# Patient Record
Sex: FEMALE | Race: WHITE | NOT HISPANIC OR LATINO | Employment: UNEMPLOYED | URBAN - METROPOLITAN AREA
[De-identification: names, ages, dates, MRNs, and addresses within clinical notes are randomized per-mention and may not be internally consistent; named-entity substitution may affect disease eponyms.]

---

## 2017-06-09 PROBLEM — G25.0 ESSENTIAL TREMOR: Status: ACTIVE | Noted: 2017-06-09

## 2017-06-09 PROBLEM — H91.93 HEARING PROBLEM OF BOTH EARS: Status: ACTIVE | Noted: 2017-06-09

## 2017-06-09 PROBLEM — E89.0 POSTOPERATIVE HYPOTHYROIDISM: Status: ACTIVE | Noted: 2017-06-09

## 2017-06-09 PROBLEM — F32.4 MAJOR DEPRESSIVE DISORDER WITH SINGLE EPISODE, IN PARTIAL REMISSION: Status: ACTIVE | Noted: 2017-06-09

## 2017-06-09 PROBLEM — F41.1 GAD (GENERALIZED ANXIETY DISORDER): Status: ACTIVE | Noted: 2017-06-09

## 2020-04-17 PROBLEM — E66.01 MORBID OBESITY: Status: ACTIVE | Noted: 2020-04-17

## 2022-05-18 ENCOUNTER — PATIENT MESSAGE (OUTPATIENT)
Dept: ADMINISTRATIVE | Facility: OTHER | Age: 27
End: 2022-05-18

## 2023-02-25 PROBLEM — Z15.89 PTEN GENE MUTATION: Status: ACTIVE | Noted: 2023-02-25

## 2023-03-17 ENCOUNTER — PATIENT MESSAGE (OUTPATIENT)
Dept: RESEARCH | Facility: HOSPITAL | Age: 28
End: 2023-03-17
Payer: MEDICAID

## 2023-04-01 DIAGNOSIS — Z15.89 PTEN GENE MUTATION: Primary | ICD-10-CM

## 2023-04-01 NOTE — ASSESSMENT & PLAN NOTE
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  This mutation gives her up to an 89% risk for developing breast cancer.  She also knows that it can cause thyroid (35%) and kidney (35%) cancers.  Uterine cancer risk is as high as 28% and colon cancers occur in 9%.  It can also cause skin, vascular and brain abnormalities.   We discussed aggressive imaging for surveillance (mammogram alternating with Breast MRI every 6 months) vs. prophylactic mastectomy.  No guarantees can be made that with increased surveillance - that a cancer would be found "early".  We talked about the social, psychological, and emotional issues surrounding bilateral risk reduction surgery.    She knows that Prophylactic Mastectomy can decrease her risks by 90-95%, but that breast cancer is still possible.  Because of this, ongoing and routine follow-ups are recommended.  We spent time discussing the pros and cons of prophylactic mastectomy along with the R/B/I and alternatives.  She was given the opportunity to visit with a Plastic Surgeon to address reconstruction issues.  She seems to be digesting this information in an appropriate manner.    At this time, she is desirous of risk reduction mastectomies but wants to consider more children/breast feeding first.  I think this is reasonable and we will follow her aggressively until she makes her final decision to proceed.    As her grandmother was 35 at the time of her breast cancer diagnosis, Renée can start alternating MGM and MRI now.  We will help her obtain her first in 6 months at the cessation of breast feeding.    "

## 2023-04-01 NOTE — PROGRESS NOTES
[facility]  Girish Bauer MD, FACS  Jayesh Coronado, EARNEST-C      Chief Complaint:   Renée Riojas is a 28 y.o. female presenting today to establish care given her Positive PTEN Mutation results.    She is currently breast feeding    History of Present Illness:   Mrs. Riojas presents on April 10, 2023 due to testing positive for a deleterious PTEN Gene.  She has a worrisome family history of breast cancer.  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  This mutation gives her up to an 85% risk for developing breast cancer.  She also knows that it can cause thyroid (35%) and kidney (35%) cancers.  Uterine cancer risk is as high as 28% and colon cancers occur in 9%.  It can also cause skin, vascular and brain abnormalities.   We discussed aggressive imagi    Sleep apnea     Thyroid disease     Tremor of right hand       Past Surgical History:   Procedure Laterality Date    THYROIDECTOMY, PARTIAL      TONSILLECTOMY          Current Outpatient Medications:     ferrous sulfate (FEROSUL) 325 mg (65 mg iron) Tab tablet, TAKE 1 TABLET BY MOUTH DAILY, Disp: 30 tablet, Rfl: 3    FLUoxetine 20 MG capsule, Take 1 capsule (20 mg total) by mouth once daily., Disp: 30 capsule, Rfl: 11    levothyroxine (SYNTHROID) 100 MCG tablet, Take 1 tablet (100 mcg total) by mouth before breakfast., Disp: 30 tablet, Rfl: 11    metFORMIN (GLUCOPHAGE-XR) 500 MG ER 24hr tablet, , Disp: , Rfl:     norethindrone (MICRONOR) 0.35 mg tablet, Take 1 tablet (0.35 mg total) by mouth once daily., Disp: 30 tablet, Rfl:  11    valACYclovir (VALTREX) 500 MG tablet, Take 1 tablet (500 mg total) by mouth once daily. Take 2000mg (4tabs) [id number] for 2 doses upon outbreak, Disp: 30 tablet, Rfl: 2   Review of patient's allergies indicates:   Allergen Reactions    Latex      Other reaction(s): Rash    Nutmeg oil (myristica seed oil)      Other reaction(s): Hives    Pumpkin      Other reaction(s): Hives      Social History     Tobacco Use    Smoking status: Never    Smokeless tobacco: Never   Substance Use Topics    Alcohol use: Yes     Comment: Occasionally      Family History   Problem Relation Age of Onset    Hypertension Mother     Diabetes type II Mother     Hypertension Father     Hypertension Maternal Grandmother     Breast cancer Maternal Grandmother 35    Diabetes type II Maternal Grandmother         Review of Systems   Integumentary:  Negative for color change, rash, mole/lesion, breast mass, breast discharge and breast tenderness.   Breast: Negative for mass and tenderness     Physical Exam   Constitutional: She appears well-developed. She is cooperative.   HENT:   Head: Normocephalic.   Cardiovascular:  Normal rate and regular rhythm.            Pulmonary/Chest: She exhibits no tenderness and no bony tenderness. Right breast exhibits no mass, no nipple discharge, no skin change and no tenderness. Left breast exhibits no mass, no nipple discharge, no skin change and no tenderness.   Abdominal: Soft. Normal appearance.   Musculoskeletal: Lymphadenopathy:      Upper Body:      Right upper body: No supraclavicular or axillary adenopathy.      Left upper body: No supraclavicular or axillary adenopathy.     Neurological: She is alert.   Skin: No rash noted.        MAMMOGRAM REPORT: we will obtain in September at the conclusion of breast feeding    ASSESSMENT and PLAN of CARE    1. 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  This mutation  "gives her up to an 89% risk for developing breast cancer.  She also knows that it can cause thyroid (35%) and kidney (35%) cancers.  Uterine cancer risk is as high as 28% and colon cancers occur in 9%.  It can also cause skin, vascular and brain abnormalities.   We discussed aggressive imaging for surveillance (mammogram alternating with Breast MRI every 6 months) vs. prophylactic mastectomy.  No guarantees can be made that with increased surveillance - that a cancer would be found "early".  We talked about the social, psychological, and emotional issues surrounding bilateral risk reduction surgery.    She knows that Prophylactic Mastectomy can decrease her risks by 90-95%, but that breast cancer is still possible.  Because of this, ongoing and routine follow-ups are recommended.  We spent time discussing the pros and cons of prophylactic mastectomy along with the R/B/I and alternatives.  She was given the opportunity to visit with a Plastic Surgeon to address reconstruction issues.  She seems to be digesting this information in an appropriate manner.    At this time, she is desirous of risk reduction mastectomies but wants to consider more children/breast feeding first.  I think this is reasonable and we will follow her aggressively until she makes her final decision to proceed.    As her grandmother was 35 at the time of her breast cancer diagnosis, Renée can start alternating MGM and MRI now.  We will help her obtain her first in 6 months at the cessation of breast feeding.        Medical Decision Making:  It is my impression that this patient suffers all conditions contained in this medical document.  Each of these conditions did affect our plan of care and my medical decision making today.  It is my opinion that the medical decision making concerning this patient was of moderate difficulty based on the aforementioned conditions.  Any further recommendations will be communicated to the patient by me.  I have " reviewed and verified her allergies, list of medications, medical and surgical histories, social history, and a pertinent review of symptoms.     Follow up:  6 months and prn    For:  PE and MGM (S)

## 2023-04-04 ENCOUNTER — PATIENT MESSAGE (OUTPATIENT)
Dept: SURGERY | Facility: CLINIC | Age: 28
End: 2023-04-04
Payer: MEDICAID

## 2023-04-10 ENCOUNTER — OFFICE VISIT (OUTPATIENT)
Dept: SURGERY | Facility: CLINIC | Age: 28
End: 2023-04-10
Payer: MEDICAID

## 2023-04-10 VITALS — BODY MASS INDEX: 41.23 KG/M2 | WEIGHT: 272.06 LBS | HEIGHT: 68 IN

## 2023-04-10 DIAGNOSIS — Z15.89 PTEN GENE MUTATION: Primary | ICD-10-CM

## 2023-04-10 PROCEDURE — 1160F RVW MEDS BY RX/DR IN RCRD: CPT | Mod: CPTII,S$GLB,, | Performed by: SPECIALIST

## 2023-04-10 PROCEDURE — 1160F PR REVIEW ALL MEDS BY PRESCRIBER/CLIN PHARMACIST DOCUMENTED: ICD-10-PCS | Mod: CPTII,S$GLB,, | Performed by: SPECIALIST

## 2023-04-10 PROCEDURE — 99203 OFFICE O/P NEW LOW 30 MIN: CPT | Mod: S$GLB,,, | Performed by: SPECIALIST

## 2023-04-10 PROCEDURE — 3008F PR BODY MASS INDEX (BMI) DOCUMENTED: ICD-10-PCS | Mod: CPTII,S$GLB,, | Performed by: SPECIALIST

## 2023-04-10 PROCEDURE — 1159F PR MEDICATION LIST DOCUMENTED IN MEDICAL RECORD: ICD-10-PCS | Mod: CPTII,S$GLB,, | Performed by: SPECIALIST

## 2023-04-10 PROCEDURE — 3008F BODY MASS INDEX DOCD: CPT | Mod: CPTII,S$GLB,, | Performed by: SPECIALIST

## 2023-04-10 PROCEDURE — 99203 PR OFFICE/OUTPT VISIT, NEW, LEVL III, 30-44 MIN: ICD-10-PCS | Mod: S$GLB,,, | Performed by: SPECIALIST

## 2023-04-10 PROCEDURE — 1159F MED LIST DOCD IN RCRD: CPT | Mod: CPTII,S$GLB,, | Performed by: SPECIALIST

## 2023-09-28 DIAGNOSIS — Z12.31 SCREENING MAMMOGRAM FOR HIGH-RISK PATIENT: Primary | ICD-10-CM

## 2023-09-28 NOTE — ASSESSMENT & PLAN NOTE
We again discussed the implications of her PT and mutation.  She understands this will give her up to 89% risk of developing breast cancer.  Other cancers are also increased risk.  At this time she is comfortable following up every 6 months alternating mammogram and MRI.  She understands that bilateral risk reduction mastectomy as an option.  She is considering this.

## 2023-09-28 NOTE — PROGRESS NOTES
[facility]  Girish Bauer MD, FACS  Jayesh Coronado NP-JODEE      Date of Service: 10/10/2023    Chief Complaint:   Renée Givens is a 28 y.o. female presenting today for her 6 month evaluation. She is due for her annual mammogram.  She reports no interval changes.     History of Present Illness:   Mrs. Riojas presents on April 10, 2023 due to testing positive for a deleterious PTEN Gene.  She has a worrisome family history of breast cancer.  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  This mutation gives her up to an 85% risk for developing breast cancer.  She also knows that it can cause thyroid (35%) and kidney (35%) cancers.  Uterine cancer risk is as high as 28% and colon cancers occur in 9%.  It can also cause skin, vascular and brain abnormalities.   We discussed aggressive imagi    Screening mammogram for high-risk patient 9/28/2023    Sleep apnea     Thyroid disease     Tremor of right hand       Past Surgical History:   Procedure Laterality Date    THYROIDECTOMY, PARTIAL      TONSILLECTOMY          Current Outpatient Medications:     ferrous sulfate (FEROSUL) 325 mg (65 mg iron) Tab tablet, TAKE 1 TABLET BY MOUTH DAILY, Disp: 30 tablet, Rfl: 3    FLUoxetine 20 MG capsule, Take 1 capsule (20 mg total) by mouth once daily., Disp: 30 capsule, Rfl: 11    levothyroxine (SYNTHROID) 100 MCG tablet, Take 1 tablet (100 mcg total) by mouth before breakfast., Disp: 30 tablet, Rfl: 11    metFORMIN (GLUCOPHAGE-XR) 500 MG ER 24hr tablet, , Disp: , Rfl:     norethindrone  (MICRONOR) 0.35 mg tablet, Take 1 tablet (0.35 mg total) by mouth once daily., Disp: 30 tablet, Rfl: 11    valACYclovir (VALTREX) 500 MG tablet, Take 1 tablet (500 mg total) by mouth once daily. Take 2000mg (4tabs) [id number] for 2 doses upon outbreak (Patient taking differently: Take 22 mg by mouth once daily. Take 2000mg (4tabs) [id number] for 2 doses upon outbreak), Disp: 30 tablet, Rfl: 2   Review of patient's allergies indicates:   Allergen Reactions    Latex      Other reaction(s): Rash    Nutmeg oil (myristica seed oil)      Other reaction(s): Hives    Pumpkin      Other reaction(s): Hives      Social History     Tobacco Use    Smoking status: Never    Smokeless tobacco: Never   Substance Use Topics    Alcohol use: Yes     Comment: Occasionally      Family History   Problem Relation Age of Onset    Hypertension Mother     Diabetes type II Mother     Hypertension Father     Hypertension Maternal Grandmother     Breast cancer Maternal Grandmother 35    Diabetes type II Maternal Grandmother         Review of Systems   Integumentary:  Negative for color change, rash, mole/lesion, breast mass, breast discharge and breast tenderness.   Breast: Negative for mass and tenderness     Physical Exam   Constitutional: She appears well-developed. She is cooperative.   HENT: Normocephalic.   Cardiovascular:  Normal rate and regular rhythm.            Pulmonary/Chest: She exhibits no tenderness and no bony tenderness.   Abdominal: Soft. Normal appearance.   Musculoskeletal: Intact with no deficits  Neurological: She is alert.   Skin: No rash noted.   Breast:  Right breast exhibits no mass, no nipple discharge, no skin change and no tenderness.     Left breast exhibits no mass, no nipple discharge, no skin change and no tenderness.   Lymphadenopathy: No supraclavicular or axillary adenopathy.    MAMMOGRAM REPORT: She has some diffuse fibronodular tissue; there are no spiculated lesions, distortions or suspicious calcifications  noted; NEM    ASSESSMENT and PLAN OF CARE     1. Screening mammogram for high-risk patient  Assessment & Plan:  Her films are stable based on my review.  As this was done as a screening exam, her films will be reviewed by the Radiologist and compared to her previous films.  Her report will usually be received within 24 hours.  Once received and reviewed - I will phone her with any additional recommendations as needed.        2. PTEN gene mutation Positive  Assessment & Plan:  We again discussed the implications of her PT and mutation.  She understands this will give her up to 89% risk of developing breast cancer.  Other cancers are also increased risk.  At this time she is comfortable following up every 6 months alternating mammogram and MRI.  She understands that bilateral risk reduction mastectomy as an option.  She is considering this.      Medical Decision Making: It is my impression that this patient suffers all conditions contained in this medical document.  Each of these conditions did affect our plan of care and my medical decision making today.  It is my opinion that the medical decision making concerning this patient was of minimal  difficulty based on the aforementioned conditions.  Any further recommendations will be communicated to the patient by me.  I have reviewed and verified her allergies, list of medications, medical and surgical histories, social history, and a pertinent review of symptoms.      Follow up:  6 months and prn    For:  Physical Examination and MRI        10/20/2023 4:48 AM ADDENDUM:  She was called back due to a left breast asymmetry.  An 8 mm benign-appearing mass was identified.  A six-month follow-up ultrasound has been recommended which we will add to her MRI.

## 2023-10-03 ENCOUNTER — PATIENT MESSAGE (OUTPATIENT)
Dept: SURGERY | Facility: CLINIC | Age: 28
End: 2023-10-03
Payer: MEDICARE

## 2023-10-08 ENCOUNTER — PATIENT MESSAGE (OUTPATIENT)
Dept: SURGERY | Facility: CLINIC | Age: 28
End: 2023-10-08
Payer: MEDICARE

## 2023-10-10 ENCOUNTER — OFFICE VISIT (OUTPATIENT)
Dept: SURGERY | Facility: CLINIC | Age: 28
End: 2023-10-10
Payer: MEDICARE

## 2023-10-10 DIAGNOSIS — Z15.89 PTEN GENE MUTATION: ICD-10-CM

## 2023-10-10 DIAGNOSIS — Z12.31 SCREENING MAMMOGRAM FOR HIGH-RISK PATIENT: ICD-10-CM

## 2023-10-10 PROCEDURE — 99999 PR PBB SHADOW E&M-EST. PATIENT-LVL II: CPT | Mod: PBBFAC,,, | Performed by: SPECIALIST

## 2023-10-10 PROCEDURE — 99999 PR PBB SHADOW E&M-EST. PATIENT-LVL II: ICD-10-PCS | Mod: PBBFAC,,, | Performed by: SPECIALIST

## 2023-10-10 PROCEDURE — 99212 OFFICE O/P EST SF 10 MIN: CPT | Mod: PBBFAC,PN | Performed by: SPECIALIST

## 2023-10-10 PROCEDURE — 99213 OFFICE O/P EST LOW 20 MIN: CPT | Mod: S$PBB,,, | Performed by: SPECIALIST

## 2023-10-10 PROCEDURE — 99213 PR OFFICE/OUTPT VISIT, EST, LEVL III, 20-29 MIN: ICD-10-PCS | Mod: S$PBB,,, | Performed by: SPECIALIST

## 2024-02-27 ENCOUNTER — PATIENT MESSAGE (OUTPATIENT)
Dept: GASTROENTEROLOGY | Facility: CLINIC | Age: 29
End: 2024-02-27
Payer: MEDICARE

## 2024-02-27 ENCOUNTER — TELEPHONE (OUTPATIENT)
Dept: GASTROENTEROLOGY | Facility: CLINIC | Age: 29
End: 2024-02-27
Payer: MEDICARE

## 2024-02-27 NOTE — TELEPHONE ENCOUNTER
----- Message from Maria Ines Perez sent at 2/27/2024  4:04 PM CST -----  Contact: self  Type:  Sooner Apoointment Request    Caller is requesting a sooner appointment.  Caller declined first available appointment listed below.  Caller will not accept being placed on the waitlist and is requesting a message be sent to doctor.  Name of Caller:Renée Givens  When is the first available appointment?schedule block  Symptoms:family history of colon cancer/ stomach problems  Would the patient rather a call back or a response via [facility]? Call back  Best Call Back Number:[phone]  Additional Information: n/a

## 2024-04-02 ENCOUNTER — OFFICE VISIT (OUTPATIENT)
Dept: GASTROENTEROLOGY | Facility: CLINIC | Age: 29
End: 2024-04-02
Payer: MEDICARE

## 2024-04-02 ENCOUNTER — LAB VISIT (OUTPATIENT)
Dept: LAB | Facility: HOSPITAL | Age: 29
End: 2024-04-02
Attending: INTERNAL MEDICINE
Payer: MEDICARE

## 2024-04-02 VITALS
WEIGHT: 293 LBS | HEIGHT: 68 IN | BODY MASS INDEX: 44.41 KG/M2 | DIASTOLIC BLOOD PRESSURE: 72 MMHG | HEART RATE: 106 BPM | SYSTOLIC BLOOD PRESSURE: 110 MMHG

## 2024-04-02 DIAGNOSIS — E66.01 MORBID OBESITY: ICD-10-CM

## 2024-04-02 DIAGNOSIS — K22.4 ESOPHAGEAL SPASM: ICD-10-CM

## 2024-04-02 DIAGNOSIS — R19.7 DIARRHEA, UNSPECIFIED TYPE: ICD-10-CM

## 2024-04-02 DIAGNOSIS — Z15.89 PTEN GENE MUTATION: ICD-10-CM

## 2024-04-02 DIAGNOSIS — R19.7 DIARRHEA, UNSPECIFIED TYPE: Primary | ICD-10-CM

## 2024-04-02 DIAGNOSIS — R14.0 ABDOMINAL BLOATING: ICD-10-CM

## 2024-04-02 LAB
CRP SERPL-MCNC: 15.3 MG/L (ref 0–8.2)
IGA SERPL-MCNC: 105 MG/DL (ref 40–350)

## 2024-04-02 PROCEDURE — 82784 ASSAY IGA/IGD/IGG/IGM EACH: CPT | Performed by: INTERNAL MEDICINE

## 2024-04-02 PROCEDURE — 99999 PR PBB SHADOW E&M-EST. PATIENT-LVL IV: CPT | Mod: PBBFAC,,, | Performed by: INTERNAL MEDICINE

## 2024-04-02 PROCEDURE — 86140 C-REACTIVE PROTEIN: CPT | Performed by: INTERNAL MEDICINE

## 2024-04-02 PROCEDURE — 99214 OFFICE O/P EST MOD 30 MIN: CPT | Mod: PBBFAC | Performed by: INTERNAL MEDICINE

## 2024-04-02 PROCEDURE — 86364 TISS TRNSGLTMNASE EA IG CLAS: CPT | Performed by: INTERNAL MEDICINE

## 2024-04-02 PROCEDURE — 99204 OFFICE O/P NEW MOD 45 MIN: CPT | Mod: S$PBB,,, | Performed by: INTERNAL MEDICINE

## 2024-04-02 NOTE — ASSESSMENT & PLAN NOTE
Plan:  -Low FODMAP diet   -Avoid dairy since it is a known trigger   -Will request records from dentist

## 2024-04-02 NOTE — PATIENT INSTRUCTIONS
Source: [email or web address]-[email or web address]

## 2024-04-02 NOTE — PROGRESS NOTES
Clinic Consult:  [facility]    Reason for Consult:  The primary encounter diagnosis was Diarrhea, unspecified type. Diagnoses of Esophageal spasm, PTEN gene mutation Positive, Morbid obesity, and Abdominal bloating were also pertinent to this visit.    [facility]P: [facility]   No address on file    HPI:  This is a 29 y.o. female here for evaluation of the following;     //PTEN mutation.   It can increase risk of colon cancer     //Abnormal oral pathology   Dentist saw bumps in her mouth and told her that she may have Crohn's disease     She experiences abdominal bloating.   Occasionally, she has diarrhea.   She has 5-10 loose brown stools a day.   She has esophageal spasms. Describes this as squeezing sensation with air coming out throat.   Denies chest pain   Denies dysphagia.   Denies family history of colon cancer.   Dairy causes bloating and abdominal pain.     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  This mutation gives her up to an 85% risk for developing breast cancer.  She also knows that it can cause thyroid (35%) and kidney (35%) cancers.  Uterine cancer risk is as high as 28% and colon cancers occur in 9%.  It can also cause skin, vascular and brain abnormalities.   We discussed aggressive imagi    Screening mammogram for high-risk patient 09/28/2023    Sleep apnea     Thyroid disease     Tremor of right hand        Surgical History:  Past Surgical History:   Procedure Laterality Date    HYSTERECTOMY, TOTAL,  "LAPAROSCOPIC, WITH SALPINGECTOMY  03/07/2024    LITHOTRIPSY      THYROIDECTOMY, PARTIAL      TONSILLECTOMY         Family History:   Family History   Problem Relation Age of Onset    Hypertension Mother     Diabetes type II Mother     Hypertension Father     Hypertension Maternal Grandmother     Breast cancer Maternal Grandmother 35    Diabetes type II Maternal Grandmother        Social History:   Social History     Tobacco Use    Smoking status: Never     Passive exposure: Never    Smokeless tobacco: Never   Substance Use Topics    Alcohol use: Yes     Comment: Occasionally    Drug use: Never       Allergies: Reviewed    Home Medications:   Medication List with Changes/Refills   Current Medications    ACYCLOVIR 5% (ZOVIRAX) 5 % OINTMENT    SMARTSIG:sparingly Topical Every 4 Hours PRN    LEVOTHYROXINE (SYNTHROID) 137 MCG TAB TABLET    Take 137 mcg by mouth.    METAXALONE (SKELAXIN) 400 MG TABLET    Take 1 tablet (400 mg total) by mouth 3 (three) times daily. for 10 days    METFORMIN (GLUCOPHAGE-XR) 500 MG ER 24HR TABLET        MONTELUKAST (SINGULAIR) 10 MG TABLET    Take 10 mg by mouth.         Physical Exam:  Vital Signs:  /72   Pulse 106   Ht 5' 8" (1.727 m)   Wt (!) 137.9 kg (304 lb 0.2 oz)   LMP 02/17/2024   BMI 46.23 kg/m²   Body mass index is 46.23 kg/m².    Physical Exam  Constitutional:       Appearance: Normal appearance. She is obese.   HENT:      Head: Normocephalic.   Eyes:      Conjunctiva/sclera: Conjunctivae normal.   Pulmonary:      Effort: Pulmonary effort is normal.   Abdominal:      General: There is no distension.      Palpations: Abdomen is soft.      Tenderness: There is no abdominal tenderness. There is no guarding.   Neurological:      Mental Status: She is alert.          Labs: Pertinent labs reviewed.        Assessment:  Problem List Items Addressed This Visit          Endocrine    Morbid obesity    Current Assessment & Plan     Continue to follow with PCP as she is having issues " with weight loss and would like to discuss weight loss medications             GI    Diarrhea - Primary    Current Assessment & Plan     Plan:   -TTG and IgA ordered   -Stool studies ordered to evaluate for infectious etiologies  -CRP, fecal calprotectin and fecal elastase ordered          Abdominal bloating    Current Assessment & Plan     Plan:  -Low FODMAP diet   -Avoid dairy since it is a known trigger   -Will request records from dentist          Esophageal spasm    Current Assessment & Plan     Plan:   -Will order esophageal manometry             Genetic    PTEN gene mutation Positive    Current Assessment & Plan     Plan:   -Guidelines recommend colonoscopy starting at age 35          Diarrhea, unspecified type  -     Calprotectin, Stool; Future; Expected date: 04/02/2024  -     C-Reactive Protein; Future; Expected date: 04/02/2024  -     Giardia / Cryptosporidum, EIA; Future; Expected date: 04/02/2024  -     Pancreatic elastase, fecal; Future; Expected date: 04/02/2024  -     Stool culture; Future; Expected date: 04/02/2024  -     Stool Exam-Ova,Cysts,Parasites; Future; Expected date: 04/02/2024  -     Tissue Transglutaminase, IgA; Future; Expected date: 04/02/2024  -     IgA; Future; Expected date: 04/02/2024    Esophageal spasm  -     Case Request Endoscopy: MOTILITY STUDY, ESOPHAGUS, USING HIGH RESOLUTION MANOMETRY    PTEN gene mutation Positive    Morbid obesity    Abdominal bloating            Follow-up after diagnostic evaluation.     Order summary:  Orders Placed This Encounter   Procedures    Stool culture    Calprotectin, Stool    C-Reactive Protein    Giardia / Cryptosporidum, EIA    Pancreatic elastase, fecal    Stool Exam-Ova,Cysts,Parasites    Tissue Transglutaminase, IgA    IgA       Thank you so much for allowing me to participate in the care of Renée Aguilar MD  Gastroenterology and Hepatology  [facility]

## 2024-04-02 NOTE — ASSESSMENT & PLAN NOTE
Continue to follow with PCP and she is having issues with weight loss and would like to discuss weight loss medications

## 2024-04-02 NOTE — ASSESSMENT & PLAN NOTE
Plan:   -TTG and IgA ordered   -Stool studies ordered to evaluate for infectious etiologies  -CRP, fecal calprotectin and fecal elastase ordered

## 2024-04-03 DIAGNOSIS — N63.25 BREAST LUMP ON LEFT SIDE AT 3 O'CLOCK POSITION: Primary | ICD-10-CM

## 2024-04-03 NOTE — ASSESSMENT & PLAN NOTE
We again discussed the implications of her PTEN mutation.  She understands this will give her up to 89% risk of developing breast cancer.  Other cancers are also increased risk.  At this time she is comfortable following up every 6 months alternating mammogram and MRI.  She understands that bilateral risk reduction mastectomy as an option.  She is considering this.

## 2024-04-03 NOTE — ASSESSMENT & PLAN NOTE
We reviewed our findings today and her questions were answered.  She understands that her imaging and exams have remained stable (and show nothing concerning).  The 8 mm nodule is stable and appears benign.  She is comfortable being followed in a conservative fashion.      She understands the importance of monthly self-breast examination and knows to report any and all changes as they occur.    NOTE:::We viewed her films together at today's visit.  We discussed the multiple views obtained and the important findings.  Even benign changes were mentioned and her questions were answered.  She knows that she may receive a formal letter or report from the Radiologist.  She is to contact us if she has questions.

## 2024-04-03 NOTE — PROGRESS NOTES
Date of Service: 4/24/2024    Chief Complaint:   Renée Givens is a 29 y.o. female presenting today for her 6-month evaluation. She is due for a repeat ultrasound and her annual Breast MRI.  She reports no interval changes.     History of Present Illness:   Mrs. 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  She has a worrisome family history of breast cancer.  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  This mutation gives her up to an 85% risk for developing breast cancer.  She also knows that it can cause thyroid (35%) and kidney (35%) cancers.  Uterine cancer risk is as high as 28% and colon cancers occur in 9%.  It can also cause skin, vascular and brain abnormalities.   We discussed aggressive imagi    Screening mammogram for high-risk patient 09/28/2023    Sleep apnea     Thyroid disease     Tremor of right hand       Past Surgical History:   Procedure Laterality Date    HYSTERECTOMY, TOTAL, LAPAROSCOPIC, WITH SALPINGECTOMY  03/07/2024    LITHOTRIPSY      THYROIDECTOMY, PARTIAL      TONSILLECTOMY          Current Outpatient Medications:     acyclovir 5% (ZOVIRAX) 5 % ointment, SMARTSIG:sparingly Topical Every 4 Hours PRN, Disp: , Rfl:     levothyroxine (SYNTHROID) 137 MCG Tab tablet, Take 137 mcg by mouth., Disp: , Rfl:     metFORMIN (GLUCOPHAGE-XR) 500 MG ER 24hr tablet, , Disp: , Rfl:     montelukast (SINGULAIR) 10 mg tablet, Take 10 mg by mouth., Disp: , Rfl:     Review of patient's allergies indicates:   Allergen Reactions    Latex      Other reaction(s): Rash    Nutmeg oil (myristica seed oil)      Other reaction(s): Hives    Pumpkin      Other reaction(s): Hives      Social History     Tobacco Use    Smoking status: Never     Passive exposure: Never    Smokeless tobacco: Never   Substance Use Topics    Alcohol use: Yes     Comment: Occasionally      Family History   Problem Relation Age of Onset    Hypertension Mother     Diabetes type II Mother     Hypertension Father     Hypertension Maternal Grandmother     Breast cancer Maternal Grandmother 35    Diabetes type II Maternal Grandmother         Review of Systems   Integumentary:  Negative for color change, rash, mole/lesion, thickening/swelling, dimpling of skin, drainage  Breast: Negative for mass and tenderness     Physical Exam   Constitutional: She appears well-developed. She is cooperative.   HENT: Normocephalic.   Cardiovascular:  Normal rate and regular rhythm.            Pulmonary/Chest: She exhibits no tenderness and no bony tenderness.   Abdominal: Soft. Normal appearance.   Musculoskeletal: Intact with no deficits  Neurological: She is alert.   Skin: No rash noted.   Breast and Chest Wall Evaluation:   Right breast exhibits no mass, no nipple discharge, no skin change and no tenderness.     Left breast exhibits no mass, no nipple discharge, no skin change and no tenderness.     Lymphadenopathy: No supraclavicular or axillary adenopathy.    ULTRASOUND REPORT: She has a stable benign-appearing nodule in the left breast;  [facility]    MRI BREAST REPORT: pending.  I will phone her with the report and make recommendations at that time.       ASSESSMENT and PLAN OF CARE     1. Breast lump on left side at 3 o'clock position  Assessment & Plan:  We reviewed our findings today and her questions were answered.  She understands that her imaging and exams have remained stable (and show nothing concerning).  The 8 mm nodule is  stable and appears benign.  She is comfortable being followed in a conservative fashion.      She understands the importance of monthly self-breast examination and knows to report any and all changes as they occur.    NOTE:::We viewed her films together at today's visit.  We discussed the multiple views obtained and the important findings.  Even benign changes were mentioned and her questions were answered.  She knows that she may receive a formal letter or report from the Radiologist.  She is to contact us if she has questions.         2. PTEN gene mutation Positive  Assessment & Plan:  We again discussed the implications of her PTEN mutation.  She understands this will give her up to 89% risk of developing breast cancer.  Other cancers are also increased risk.  At this time she is comfortable following up every 6 months alternating mammogram and MRI.  She understands that bilateral risk reduction mastectomy as an option.  She is considering this.      Medical Decision Making: It is my impression that this patient suffers all conditions contained in this medical document.  Each of these conditions did affect our plan of care and my medical decision making today.  It is my opinion that the medical decision making concerning this patient was of minimal  difficulty based on the aforementioned conditions.  Any further recommendations will be communicated to the patient by me.  I have reviewed and verified her allergies, list of medications, medical and surgical histories, social history, and a pertinent review of symptoms.     Follow up: 6 months and prn    For:  PE, MGM (D) at [facility] and repeat US (D) left at [facility]        4/24/2024 3:45 PM ADDENDUM:  Her MRI showed bilateral abnormalities for which second-look ultrasound has been recommended.  I have phoned her this report and we will proceed as she allows.    Girish Bauer MD, FACS      5/6/2024 9:26 AM ADDENDUM: Her left sonocore breast biopsy showed benign  changes, and nothing atypical or cancerous.  She is aware.    Girish Bauer MD, FACS       5/7/2024 9:51 AM ADDENDUM:  Her right MRI biopsy was benign.  She is leaning toward bilateral mastectomy.  We will get her in for a preop.    Girish Bauer MD, FACS

## 2024-04-06 ENCOUNTER — PATIENT MESSAGE (OUTPATIENT)
Dept: GASTROENTEROLOGY | Facility: CLINIC | Age: 29
End: 2024-04-06
Payer: MEDICARE

## 2024-04-08 ENCOUNTER — PATIENT MESSAGE (OUTPATIENT)
Dept: ENDOSCOPY | Facility: HOSPITAL | Age: 29
End: 2024-04-08
Payer: MEDICARE

## 2024-04-09 LAB — TTG IGA SER-ACNC: <0.2 U/ML

## 2024-04-18 ENCOUNTER — HOSPITAL ENCOUNTER (OUTPATIENT)
Facility: HOSPITAL | Age: 29
Discharge: HOME | End: 2024-04-18
Attending: INTERNAL MEDICINE | Admitting: INTERNAL MEDICINE
Payer: MEDICARE

## 2024-04-18 VITALS
TEMPERATURE: 97 F | HEART RATE: 110 BPM | SYSTOLIC BLOOD PRESSURE: 149 MMHG | DIASTOLIC BLOOD PRESSURE: 71 MMHG | RESPIRATION RATE: 20 BRPM | OXYGEN SATURATION: 98 %

## 2024-04-18 PROCEDURE — 91037 ESOPH IMPED FUNCTION TEST: CPT | Mod: TC | Performed by: INTERNAL MEDICINE

## 2024-04-18 PROCEDURE — 91010 ESOPHAGUS MOTILITY STUDY: CPT | Mod: TC | Performed by: INTERNAL MEDICINE

## 2024-04-18 RX ORDER — LIDOCAINE HYDROCHLORIDE 20 MG/ML
2 SOLUTION OROPHARYNGEAL ONCE
Status: CANCELLED | OUTPATIENT
Start: 2024-04-18 | End: 2024-04-18

## 2024-04-24 ENCOUNTER — PATIENT MESSAGE (OUTPATIENT)
Dept: PRIMARY CARE CLINIC | Facility: CLINIC | Age: 29
End: 2024-04-24
Payer: MEDICARE

## 2024-04-24 ENCOUNTER — OFFICE VISIT (OUTPATIENT)
Dept: SURGERY | Facility: CLINIC | Age: 29
End: 2024-04-24
Payer: MEDICARE

## 2024-04-24 DIAGNOSIS — N63.25 BREAST LUMP ON LEFT SIDE AT 3 O'CLOCK POSITION: Primary | ICD-10-CM

## 2024-04-24 DIAGNOSIS — Z15.89 PTEN GENE MUTATION: ICD-10-CM

## 2024-04-24 PROCEDURE — 99212 OFFICE O/P EST SF 10 MIN: CPT | Mod: PBBFAC,PN | Performed by: SPECIALIST

## 2024-04-24 PROCEDURE — 99213 OFFICE O/P EST LOW 20 MIN: CPT | Mod: S$PBB,,, | Performed by: SPECIALIST

## 2024-04-24 PROCEDURE — 99999 PR PBB SHADOW E&M-EST. PATIENT-LVL II: CPT | Mod: PBBFAC,,, | Performed by: SPECIALIST

## 2024-04-27 ENCOUNTER — PATIENT MESSAGE (OUTPATIENT)
Dept: SURGERY | Facility: CLINIC | Age: 29
End: 2024-04-27
Payer: MEDICARE

## 2024-04-29 ENCOUNTER — TELEPHONE (OUTPATIENT)
Dept: SURGERY | Facility: CLINIC | Age: 29
End: 2024-04-29
Payer: MEDICARE

## 2024-05-09 PROCEDURE — 91037 ESOPH IMPED FUNCTION TEST: CPT | Mod: 26,,, | Performed by: INTERNAL MEDICINE

## 2024-05-09 PROCEDURE — 91010 ESOPHAGUS MOTILITY STUDY: CPT | Mod: 26,,, | Performed by: INTERNAL MEDICINE

## 2024-05-09 NOTE — [employer] PATIENT INSTRUCTIONS
Discharge Summary/Instructions after an Endoscopic Procedure  Patient Name: Renée Givens  Patient MRN: [id number]  Patient YOB: 1995 Friday, April 19, 2024  Yovany Henderson MD  Dear patient,  As a result of recent federal legislation (The Federal Cures Act), you may   receive lab or pathology results from your procedure in your [facility]   account before your physician is able to contact you. Your physician or   their representative will relay the results to you with their   recommendations at their soonest availability.  Thank you,  RESTRICTIONS:  During your procedure today, you received medications for sedation.  These   medications may affect your judgment, balance and coordination.  Therefore,   for 24 hours, you have the following restrictions:   - DO NOT drive a car, operate machinery, make legal/financial decisions,   sign important papers or drink alcohol.    ACTIVITY:  Today: no heavy lifting, straining or running due to procedural   sedation/anesthesia.  The following day: return to full activity including work.  DIET:  Eat and drink normally unless instructed otherwise.     TREATMENT FOR COMMON SIDE EFFECTS:  - Mild abdominal pain, nausea, belching, bloating or excessive gas:  rest,   eat lightly and use a heating pad.  - Sore Throat: treat with throat lozenges and/or gargle with warm salt   water.  - Because air was used during the procedure, expelling large amounts of air   from your rectum or belching is normal.  - If a bowel prep was taken, you may not have a bowel movement for 1-3 days.    This is normal.  SYMPTOMS TO WATCH FOR AND REPORT TO YOUR PHYSICIAN:  1. Abdominal pain or bloating, other than gas cramps.  2. Chest pain.  3. Back pain.  4. Signs of infection such as: chills or fever occurring within 24 hours   after the procedure.  5. Rectal bleeding, which would show as bright red, maroon, or black stools.   (A tablespoon of blood from the rectum is not serious, especially  if   hemorrhoids are present.)  6. Vomiting.  7. Weakness or dizziness.  GO DIRECTLY TO THE NEAREST EMERGENCY ROOM IF YOU HAVE ANY OF THE FOLLOWING:      Difficulty breathing              Chills and/or fever over 101 F   Persistent vomiting and/or vomiting blood   Severe abdominal pain   Severe chest pain   Black, tarry stools   Bleeding- more than one tablespoon   Any other symptom or condition that you feel may need urgent attention  Your doctor recommends these additional instructions:  If any biopsies were taken, your doctors clinic will contact you in 1 to 2   weeks with any results.  -Recommend further evalaution with either barium esophagogram combined with   a barium tablet and/or endoFLIP  Continue Present medications  - Resume previous diet.   - Return to referring physician as previously scheduled.  For questions, problems or results please call your physician Yovany Henderson MD at Work:  [phone]  If you have any questions about the above instructions, call the GI   department at [phone] or call the endoscopy unit at [phone]   from 7am until 3 pm.  [facility], EMERGENCY ROOM PHONE NUMBER:   [phone]  IF A COMPLICATION OR EMERGENCY SITUATION ARISES AND YOU ARE UNABLE TO REACH   YOUR PHYSICIAN - GO DIRECTLY TO THE EMERGENCY ROOM.  I have read or have had read to me these discharge instructions for my   procedure and have received a written copy.  I understand these   instructions and will follow-up with my physician if I have any questions.     __________________________________       _____________________________________  Nurse Signature                                          Patient/Designated   Responsible Party Signature  Yovany Henderson MD  5/9/2024 1:24:14 PM  [employer]

## 2024-05-14 ENCOUNTER — OFFICE VISIT (OUTPATIENT)
Dept: GASTROENTEROLOGY | Facility: CLINIC | Age: 29
End: 2024-05-14
Payer: MEDICARE

## 2024-05-14 ENCOUNTER — PATIENT MESSAGE (OUTPATIENT)
Dept: GASTROENTEROLOGY | Facility: CLINIC | Age: 29
End: 2024-05-14

## 2024-05-14 DIAGNOSIS — R19.7 DIARRHEA, UNSPECIFIED TYPE: Primary | ICD-10-CM

## 2024-05-14 DIAGNOSIS — K22.2 ESOPHAGOGASTRIC JUNCTION OUTFLOW OBSTRUCTION: ICD-10-CM

## 2024-05-14 PROBLEM — K22.89 ESOPHAGOGASTRIC JUNCTION OUTFLOW OBSTRUCTION: Status: ACTIVE | Noted: 2024-04-02

## 2024-05-14 PROBLEM — K31.89 ESOPHAGOGASTRIC JUNCTION OUTFLOW OBSTRUCTION: Status: ACTIVE | Noted: 2024-04-02

## 2024-05-14 PROCEDURE — 99214 OFFICE O/P EST MOD 30 MIN: CPT | Mod: 95,,, | Performed by: INTERNAL MEDICINE

## 2024-05-14 NOTE — PROGRESS NOTES
Clinic Progress Note:  [facility] Progress Note    Reason for Visit:  The primary encounter diagnosis was Diarrhea, unspecified type. A diagnosis of Esophagogastric junction outflow obstruction was also pertinent to this visit.    PCP: [facility]       Visit type: audiovisual      20 minutes of total time spent on the encounter, which includes face to face time and non-face to face time preparing to see the patient (eg, review of tests), Obtaining and/or reviewing separately obtained history, Documenting clinical information in the electronic or other health record, Independently interpreting results (not separately reported) and communicating results to the patient/family/caregiver, or Care coordination (not separately reported).   Each patient to whom he or she provides medical services by telemedicine is: (1) informed of the relationship between the physician and patient and the respective role of any other health care provider with respect to management of the patient; and (2) notified that he or she may decline to receive medical services by telemedicine and may withdraw from such care at any time.    HPI:  This is a 29 y.o. female here for evaluation of the following;      //PTEN mutation.   It can increase risk of colon cancer      //Abnormal oral pathology   Dentist saw bumps in her mouth and told her that she may have candida.     //Diarrhea  Occasionally, she has diarrhea.   She has 5-10 loose brown stools a day.   Labs were normal.  Fecal calprotectin was borderline abnormal   CRP was elevated.     //Esophageal spasms   She has esophageal spasms. Describes this as squeezing sensation with air coming out throat.   Denies chest pain   Denies dysphagia.   Manometry revealed EGD outflow obstruction.       Denies family history of colon cancer.   Dairy causes bloating and abdominal pain.          ROS:  As above HPI      Allergies: Reviewed    Home Medications:    Medication List with Changes/Refills   Current Medications    ACYCLOVIR 5% (ZOVIRAX) 5 % OINTMENT    SMARTSIG:sparingly Topical Every 4 Hours PRN    LEVOTHYROXINE (SYNTHROID) 137 MCG TAB TABLET    Take 137 mcg by mouth.    METFORMIN (GLUCOPHAGE-XR) 500 MG ER 24HR TABLET        MONTELUKAST (SINGULAIR) 10 MG TABLET    Take 10 mg by mouth.         Physical Exam:  Vital Signs:  LMP 02/17/2024   There is no height or weight on file to calculate BMI.    Physical Exam  Vitals reviewed: virtual visit.          Labs: Pertinent labs reviewed.        Assessment:  Problem List Items Addressed This Visit          GI    Diarrhea - Primary    Current Assessment & Plan     Plan:   -EGD and colon ordered          Esophagogastric junction outflow obstruction    Current Assessment & Plan     Plan:   -Time barium esophogram ordered          Diarrhea, unspecified type  -     Ambulatory referral/consult to Endo Procedure ; Future; Expected date: 05/15/2024    Esophagogastric junction outflow obstruction  -     FL Esophagram With Barium Tablet; Future; Expected date: 05/14/2024            Follow-up after diagnostic evaluation.     Order summary:  Orders Placed This Encounter   Procedures    FL Esophagram With Barium Tablet    Ambulatory referral/consult to Endo Procedure        Thank you so much for allowing me to participate in the care of Renée Aguilar MD  Gastroenterology and Hepatology  [facility]

## 2024-05-15 ENCOUNTER — HOSPITAL ENCOUNTER (OUTPATIENT)
Dept: PREADMISSION TESTING | Facility: HOSPITAL | Age: 29
Discharge: HOME | End: 2024-05-15
Attending: INTERNAL MEDICINE
Payer: MEDICARE

## 2024-05-15 VITALS — BODY MASS INDEX: 44.41 KG/M2 | HEIGHT: 68 IN | WEIGHT: 293 LBS

## 2024-05-15 DIAGNOSIS — R19.7 DIARRHEA, UNSPECIFIED TYPE: Primary | ICD-10-CM

## 2024-05-15 RX ORDER — SODIUM, POTASSIUM,MAG SULFATES 17.5-3.13G
1 SOLUTION, RECONSTITUTED, ORAL ORAL DAILY
Qty: 1 KIT | Refills: 0 | Status: SHIPPED | OUTPATIENT
Start: 2024-05-15 | End: 2024-05-17

## 2024-05-24 ENCOUNTER — PATIENT MESSAGE (OUTPATIENT)
Dept: SURGERY | Facility: CLINIC | Age: 29
End: 2024-05-24
Payer: MEDICARE

## 2024-05-24 ENCOUNTER — TELEPHONE (OUTPATIENT)
Dept: SURGERY | Facility: CLINIC | Age: 29
End: 2024-05-24
Payer: MEDICARE

## 2024-05-30 ENCOUNTER — HOSPITAL ENCOUNTER (OUTPATIENT)
Facility: HOSPITAL | Age: 29
Discharge: HOME | End: 2024-05-30
Attending: INTERNAL MEDICINE | Admitting: INTERNAL MEDICINE
Payer: MEDICARE

## 2024-05-30 ENCOUNTER — ANESTHESIA EVENT (OUTPATIENT)
Dept: ENDOSCOPY | Facility: HOSPITAL | Age: 29
End: 2024-05-30
Payer: MEDICARE

## 2024-05-30 ENCOUNTER — ANESTHESIA (OUTPATIENT)
Dept: ENDOSCOPY | Facility: HOSPITAL | Age: 29
End: 2024-05-30
Payer: MEDICARE

## 2024-05-30 DIAGNOSIS — R19.7 DIARRHEA: ICD-10-CM

## 2024-05-30 DIAGNOSIS — R19.7 DIARRHEA, UNSPECIFIED TYPE: Primary | ICD-10-CM

## 2024-05-30 LAB — POCT GLUCOSE: 97 MG/DL (ref 70–110)

## 2024-05-30 PROCEDURE — 45385 COLONOSCOPY W/LESION REMOVAL: CPT | Performed by: INTERNAL MEDICINE

## 2024-05-30 PROCEDURE — 88342 IMHCHEM/IMCYTCHM 1ST ANTB: CPT | Performed by: PATHOLOGY

## 2024-05-30 PROCEDURE — 37000009 HC ANESTHESIA EA ADD 15 MINS: Performed by: INTERNAL MEDICINE

## 2024-05-30 PROCEDURE — 88305 TISSUE EXAM BY PATHOLOGIST: CPT | Mod: 59 | Performed by: PATHOLOGY

## 2024-05-30 PROCEDURE — 25000003 PHARM REV CODE 250: Performed by: NURSE ANESTHETIST, CERTIFIED REGISTERED

## 2024-05-30 PROCEDURE — 45385 COLONOSCOPY W/LESION REMOVAL: CPT | Mod: ,,, | Performed by: INTERNAL MEDICINE

## 2024-05-30 PROCEDURE — 43239 EGD BIOPSY SINGLE/MULTIPLE: CPT | Mod: ,,, | Performed by: INTERNAL MEDICINE

## 2024-05-30 PROCEDURE — 27200997: Performed by: INTERNAL MEDICINE

## 2024-05-30 PROCEDURE — 63600175 PHARM REV CODE 636 W HCPCS: Performed by: NURSE ANESTHETIST, CERTIFIED REGISTERED

## 2024-05-30 PROCEDURE — 45380 COLONOSCOPY AND BIOPSY: CPT | Mod: 59 | Performed by: INTERNAL MEDICINE

## 2024-05-30 PROCEDURE — 45380 COLONOSCOPY AND BIOPSY: CPT | Mod: 59,,, | Performed by: INTERNAL MEDICINE

## 2024-05-30 PROCEDURE — 43239 EGD BIOPSY SINGLE/MULTIPLE: CPT | Performed by: INTERNAL MEDICINE

## 2024-05-30 PROCEDURE — 27201089 HC SNARE, DISP (ANY): Performed by: INTERNAL MEDICINE

## 2024-05-30 PROCEDURE — 27201012 HC FORCEPS, HOT/COLD, DISP: Performed by: INTERNAL MEDICINE

## 2024-05-30 PROCEDURE — 37000008 HC ANESTHESIA 1ST 15 MINUTES: Performed by: INTERNAL MEDICINE

## 2024-05-30 PROCEDURE — 88305 TISSUE EXAM BY PATHOLOGIST: CPT | Mod: 26,,, | Performed by: PATHOLOGY

## 2024-05-30 PROCEDURE — 88342 IMHCHEM/IMCYTCHM 1ST ANTB: CPT | Mod: 26,,, | Performed by: PATHOLOGY

## 2024-05-30 RX ORDER — LIDOCAINE HYDROCHLORIDE 20 MG/ML
INJECTION INTRAVENOUS
Status: DISCONTINUED | OUTPATIENT
Start: 2024-05-30 | End: 2024-05-30

## 2024-05-30 RX ORDER — PROPOFOL 10 MG/ML
VIAL (ML) INTRAVENOUS
Status: DISCONTINUED | OUTPATIENT
Start: 2024-05-30 | End: 2024-05-30

## 2024-05-30 RX ADMIN — PROPOFOL 50 MG: 10 INJECTION, EMULSION INTRAVENOUS at 12:05

## 2024-05-30 RX ADMIN — PROPOFOL 50 MG: 10 INJECTION, EMULSION INTRAVENOUS at 01:05

## 2024-05-30 RX ADMIN — PROPOFOL 30 MG: 10 INJECTION, EMULSION INTRAVENOUS at 12:05

## 2024-05-30 RX ADMIN — LIDOCAINE HYDROCHLORIDE 50 MG: 20 INJECTION INTRAVENOUS at 12:05

## 2024-05-30 RX ADMIN — PROPOFOL 80 MG: 10 INJECTION, EMULSION INTRAVENOUS at 12:05

## 2024-05-30 RX ADMIN — SODIUM CHLORIDE, SODIUM LACTATE, POTASSIUM CHLORIDE, AND CALCIUM CHLORIDE: .6; .31; .03; .02 INJECTION, SOLUTION INTRAVENOUS at 12:05

## 2024-05-30 NOTE — PROVATION PATIENT INSTRUCTIONS
Discharge Summary/Instructions after an Endoscopic Procedure  Patient Name: Renée Givens  Patient MRN: [id number]  Patient YOB: 1995  Thursday, May 30, 2024 Odalys Aguilar MD  Dear patient,  As a result of recent federal legislation (The Federal Cures Act), you may   receive lab or pathology results from your procedure in your [facility]   account before your physician is able to contact you. Your physician or   their representative will relay the results to you with their   recommendations at their soonest availability.  Thank you,  RESTRICTIONS:  During your procedure today, you received medications for sedation.  These   medications may affect your judgment, balance and coordination.  Therefore,   for 24 hours, you have the following restrictions:   - DO NOT drive a car, operate machinery, make legal/financial decisions,   sign important papers or drink alcohol.    ACTIVITY:  Today: no heavy lifting, straining or running due to procedural   sedation/anesthesia.  The following day: return to full activity including work.  DIET:  Eat and drink normally unless instructed otherwise.     TREATMENT FOR COMMON SIDE EFFECTS:  - Mild abdominal pain, nausea, belching, bloating or excessive gas:  rest,   eat lightly and use a heating pad.  - Sore Throat: treat with throat lozenges and/or gargle with warm salt   water.  - Because air was used during the procedure, expelling large amounts of air   from your rectum or belching is normal.  - If a bowel prep was taken, you may not have a bowel movement for 1-3 days.    This is normal.  SYMPTOMS TO WATCH FOR AND REPORT TO YOUR PHYSICIAN:  1. Abdominal pain or bloating, other than gas cramps.  2. Chest pain.  3. Back pain.  4. Signs of infection such as: chills or fever occurring within 24 hours   after the procedure.  5. Rectal bleeding, which would show as bright red, maroon, or black stools.   (A tablespoon of blood from the rectum is not serious, especially  if   hemorrhoids are present.)  6. Vomiting.  7. Weakness or dizziness.  GO DIRECTLY TO THE NEAREST EMERGENCY ROOM IF YOU HAVE ANY OF THE FOLLOWING:      Difficulty breathing              Chills and/or fever over 101 F   Persistent vomiting and/or vomiting blood   Severe abdominal pain   Severe chest pain   Black, tarry stools   Bleeding- more than one tablespoon   Any other symptom or condition that you feel may need urgent attention  Your doctor recommends these additional instructions:  If any biopsies were taken, your doctors clinic will contact you in 1 to 2   weeks with any results.  - Discharge patient to home.   - Resume previous diet.   - Continue present medications.   - Await pathology results.   - Return to referring physician.  For questions, problems or results please call your physician Odalys Aguilar MD at Work:  [phone]  If you have any questions about the above instructions, call the GI   department at [phone] or call the endoscopy unit at [phone]   from 7am until 3 pm.  [facility], EMERGENCY ROOM PHONE NUMBER:   [phone]  IF A COMPLICATION OR EMERGENCY SITUATION ARISES AND YOU ARE UNABLE TO REACH   YOUR PHYSICIAN - GO DIRECTLY TO THE EMERGENCY ROOM.  I have read or have had read to me these discharge instructions for my   procedure and have received a written copy.  I understand these   instructions and will follow-up with my physician if I have any questions.     __________________________________       _____________________________________  Nurse Signature                                          Patient/Designated   Responsible Party Signature  Odalys Aguilar MD  5/30/2024 1:27:58 PM  This report has been verified and signed electronically.  Dear patient,  As a result of recent federal legislation (The Federal Cures Act), you may   receive lab or pathology results from your procedure in your [facility]   account before your  physician is able to contact you. Your physician or   their representative will relay the results to you with their   recommendations at their soonest availability.  Thank you,  [employer]

## 2024-05-30 NOTE — ANESTHESIA PREPROCEDURE EVALUATION
05/30/2024  Renée Givens is a 29 y.o., female.      Pre-op Assessment    I have reviewed the Patient Summary Reports.     I have reviewed the Nursing Notes. I have reviewed the NPO Status.   I have reviewed the Medications.     Review of Systems  Anesthesia Hx:  No problems with previous Anesthesia                Social:   Pt. Is Deaf      Hematology/Oncology:  Hematology Normal   Oncology Normal                                   EENT/Dental:  EENT/Dental Normal           Cardiovascular:     Hypertension                                        Pulmonary:        Sleep Apnea                Renal/:  Chronic Renal Disease                Hepatic/GI:  Hepatic/GI Normal                 Musculoskeletal:  Musculoskeletal Normal                Neurological:    Neuromuscular Disease,                                   Endocrine:   Hypothyroidism        Morbid Obesity / BMI > 40  Dermatological:  Skin Normal    Psych:  Psychiatric History                  Physical Exam  General: Well nourished, Cooperative, Alert and Oriented    Airway:  Mallampati: II   Mouth Opening: Normal  TM Distance: Normal  Tongue: Normal  Neck ROM: Normal ROM    Dental:  Intact    Chest/Lungs:  Clear to auscultation    Heart:  Rhythm: Regular Rhythm  Sounds: Normal    Abdomen:  Normal        Anesthesia Plan  Type of Anesthesia, risks & benefits discussed:    Anesthesia Type: MAC  Intra-op Monitoring Plan: Standard ASA Monitors  Induction:  IV  Informed Consent: Informed consent signed with the Patient and all parties understand the risks and agree with anesthesia plan.  All questions answered.   ASA Score: 2  Day of Surgery Review of History & Physical: I have interviewed and examined the patient. I have reviewed the patient's H&P dated:     Ready For Surgery From Anesthesia Perspective.     .

## 2024-05-30 NOTE — PROVATION PATIENT INSTRUCTIONS
Discharge Summary/Instructions after an Endoscopic Procedure  Patient Name: Renée Givens  Patient MRN: [id number]  Patient YOB: 1995  Thursday, May 30, 2024 Odalys Aguilar MD  Dear patient,  As a result of recent federal legislation (The Federal Cures Act), you may   receive lab or pathology results from your procedure in your [facility]   account before your physician is able to contact you. Your physician or   their representative will relay the results to you with their   recommendations at their soonest availability.  Thank you,  RESTRICTIONS:  During your procedure today, you received medications for sedation.  These   medications may affect your judgment, balance and coordination.  Therefore,   for 24 hours, you have the following restrictions:   - DO NOT drive a car, operate machinery, make legal/financial decisions,   sign important papers or drink alcohol.    ACTIVITY:  Today: no heavy lifting, straining or running due to procedural   sedation/anesthesia.  The following day: return to full activity including work.  DIET:  Eat and drink normally unless instructed otherwise.     TREATMENT FOR COMMON SIDE EFFECTS:  - Mild abdominal pain, nausea, belching, bloating or excessive gas:  rest,   eat lightly and use a heating pad.  - Sore Throat: treat with throat lozenges and/or gargle with warm salt   water.  - Because air was used during the procedure, expelling large amounts of air   from your rectum or belching is normal.  - If a bowel prep was taken, you may not have a bowel movement for 1-3 days.    This is normal.  SYMPTOMS TO WATCH FOR AND REPORT TO YOUR PHYSICIAN:  1. Abdominal pain or bloating, other than gas cramps.  2. Chest pain.  3. Back pain.  4. Signs of infection such as: chills or fever occurring within 24 hours   after the procedure.  5. Rectal bleeding, which would show as bright red, maroon, or black stools.   (A tablespoon of blood from the rectum is not serious, especially  if   hemorrhoids are present.)  6. Vomiting.  7. Weakness or dizziness.  GO DIRECTLY TO THE NEAREST EMERGENCY ROOM IF YOU HAVE ANY OF THE FOLLOWING:      Difficulty breathing              Chills and/or fever over 101 F   Persistent vomiting and/or vomiting blood   Severe abdominal pain   Severe chest pain   Black, tarry stools   Bleeding- more than one tablespoon   Any other symptom or condition that you feel may need urgent attention  Your doctor recommends these additional instructions:  If any biopsies were taken, your doctors clinic will contact you in 1 to 2   weeks with any results.  - Discharge patient to home.   - Resume previous diet.   - Continue present medications.   - Await pathology results.   - Repeat colonoscopy in 1 year for surveillance.   - Return to referring physician.   - Patient has a contact number available for emergencies.  The signs and   symptoms of potential delayed complications were discussed with the   patient.  Return to normal activities tomorrow.  Written discharge   instructions were provided to the patient.  For questions, problems or results please call your physician Odalys Aguilar MD at Work:  [phone]  If you have any questions about the above instructions, call the GI   department at [phone] or call the endoscopy unit at [phone]   from 7am until 3 pm.  [facility] [place], EMERGENCY ROOM PHONE NUMBER:   [phone]  IF A COMPLICATION OR EMERGENCY SITUATION ARISES AND YOU ARE UNABLE TO REACH   YOUR PHYSICIAN - GO DIRECTLY TO THE EMERGENCY ROOM.  I have read or have had read to me these discharge instructions for my   procedure and have received a written copy.  I understand these   instructions and will follow-up with my physician if I have any questions.     __________________________________       _____________________________________  Nurse Signature                                          Patient/Designated   Responsible Party  Signature  Odalys Aguilar MD  5/30/2024 1:33:33 PM  This report has been verified and signed electronically.  Dear patient,  As a result of recent federal legislation (The Federal Cures Act), you may   receive lab or pathology results from your procedure in your [facility]   account before your physician is able to contact you. Your physician or   their representative will relay the results to you with their   recommendations at their soonest availability.  Thank you,  PROVATION   Quality 226: Preventive Care And Screening: Tobacco Use: Screening And Cessation Intervention: Patient screened for tobacco use and is an ex/non-smoker Quality 130: Documentation Of Current Medications In The Medical Record: Current Medications Documented Detail Level: Detailed Quality 431: Preventive Care And Screening: Unhealthy Alcohol Use - Screening: Patient not identified as an unhealthy alcohol user when screened for unhealthy alcohol use using a systematic screening method

## 2024-05-30 NOTE — TRANSFER OF CARE
Anesthesia Transfer of Care Note    Patient: Renée Givens    Procedure(s) Performed: Procedure(s) (LRB):  EGD (ESOPHAGOGASTRODUODENOSCOPY) (N/A)  COLONOSCOPY (N/A)    Patient location: PACU    Anesthesia Type: MAC    Transport from OR: Transported from OR on room air with adequate spontaneous ventilation    Post pain: adequate analgesia    Post assessment: no apparent anesthetic complications    Post vital signs: stable    Level of consciousness: responds to stimulation    Nausea/Vomiting: no nausea/vomiting    Complications: none    Transfer of care protocol was followed      Last vitals: Visit Vitals  /73 (BP Location: Left arm, Patient Position: Lying)   Pulse 100   Temp 36.5 °C (97.7 °F) (Temporal)   Resp 18   LMP 02/17/2024   SpO2 97%   Breastfeeding No

## 2024-05-30 NOTE — H&P
PRE PROCEDURE H&P    Patient Name: Renée Givens  MRN: [id number]  : 1995  Date of Procedure:  2024  Referring Physician: Odalys Aguilar MD  Primary Physician: [facility]  Procedure Physician: Odalys Aguilar MD       Planned Procedure: Colonoscopy and EGD    his is a 29 y.o. female here for evaluation of the following;      //PTEN mutation.   It can increase risk of colon cancer      //Abnormal oral pathology   Dentist saw bumps in her mouth and told her that she may have candida.      //Diarrhea  Occasionally, she has diarrhea.   She has 5-10 loose brown stools a day.   Labs were normal.  Fecal calprotectin was borderline abnormal   CRP was elevated.      //Esophageal spasms   She has esophageal spasms. Describes this as squeezing sensation with air coming out throat.   Denies chest pain   Denies dysphagia.   Manometry revealed EGD outflow obstruction.         Denies family history of colon cancer.   Dairy causes bloating and abdominal pain.   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  This mutation gives her up to an 85% risk for developing breast cancer.  She also knows that it can cause thyroid (35%) and kidney (35%) cancers.  Uterine cancer risk is as high as 28% and colon cancers occur in 9%.  It can also cause skin, vascular and brain abnormalities.   We discussed aggressive imagi    Screening mammogram for high-risk patient 2023    Sleep apnea     Thyroid disease      Tremor of right hand         Past Surgical History:  Past Surgical History:   Procedure Laterality Date    ESOPHAGEAL MOTILITY STUDY USING HIGH RESOLUTION MANOMETRY N/A 4/18/2024    Procedure: MOTILITY STUDY, ESOPHAGUS, USING HIGH RESOLUTION MANOMETRY;  Surgeon: Joselo Sidhu, First Available;  Location: [facility];  Service: Endoscopy;  Laterality: N/A;    HYSTERECTOMY, TOTAL, LAPAROSCOPIC, WITH SALPINGECTOMY  03/07/2024    LITHOTRIPSY      THYROIDECTOMY, PARTIAL      TONSILLECTOMY          Home Medications:  Prior to Admission medications    Medication Sig Start Date End Date Taking? Authorizing Provider   levothyroxine (SYNTHROID) 137 MCG Tab tablet Take 137 mcg by mouth. 1/29/24  Yes Provider, Historical   metFORMIN (GLUCOPHAGE-XR) 500 MG ER 24hr tablet  2/8/22  Yes Provider, Historical   montelukast (SINGULAIR) 10 mg tablet Take 10 mg by mouth.   Yes Provider, Historical   acyclovir 5% (ZOVIRAX) 5 % ointment SMARTSIG:sparingly Topical Every 4 Hours PRN 7/27/23   Provider, Historical        Allergies:  Review of patient's allergies indicates:   Allergen Reactions    Latex      Other reaction(s): Rash    Nutmeg oil (myristica seed oil)      Other reaction(s): Hives    Pumpkin      Other reaction(s): Hives        Social History:   Social History     Socioeconomic History    Marital status:    Tobacco Use    Smoking status: Never     Passive exposure: Never    Smokeless tobacco: Never   Substance and Sexual Activity    Alcohol use: Yes     Comment: Occasionally    Drug use: Never    Sexual activity: Yes     Partners: Male     Birth control/protection: See Surgical Hx     Comment: Hyst.       Family History:  Family History   Problem Relation Name Age of Onset    Hypertension Mother      Diabetes type II Mother      Hypertension Father      Hypertension Maternal Grandmother      Breast cancer Maternal Grandmother  35    Diabetes type II Maternal Grandmother         ROS: No acute cardiac events, no acute  respiratory complaints.     Physical Exam (all patients):    /73 (BP Location: Left arm, Patient Position: Lying)   Pulse 100   Temp 97.7 °F (36.5 °C) (Temporal)   Resp 18   LMP 02/17/2024   SpO2 97%   Breastfeeding No   Lungs: Clear to auscultation bilaterally, respirations unlabored  Heart: Regular rate and rhythm, S1 and S2 normal, no obvious murmurs  Abdomen:         Soft, non-tender, bowel sounds normal, no masses, no organomegaly    Lab Results   Component Value Date    WBC 7.56 02/22/2024    MCV 81 (L) 02/22/2024    RDW 16.8 (H) 02/22/2024     02/22/2024     (H) 06/30/2020    HGBA1C 6.2 (H) 01/24/2024    BUN 11 01/18/2023     01/18/2023    K 4.1 01/18/2023     01/18/2023        SEDATION PLAN: per anesthesia      History reviewed, vital signs satisfactory, cardiopulmonary status satisfactory, sedation options, risks and plans have been discussed with the patient  All their questions were answered and the patient agrees to the sedation procedures as planned and the patient is deemed an appropriate candidate for the sedation as planned.    Procedure explained to patient, informed consent obtained and placed in chart.    Odalys Aguilar  5/30/2024  12:45 PM

## 2024-05-31 VITALS
SYSTOLIC BLOOD PRESSURE: 111 MMHG | RESPIRATION RATE: 20 BRPM | DIASTOLIC BLOOD PRESSURE: 58 MMHG | OXYGEN SATURATION: 100 % | HEART RATE: 101 BPM | TEMPERATURE: 98 F

## 2024-06-03 NOTE — ANESTHESIA POSTPROCEDURE EVALUATION
Anesthesia Post Evaluation    Patient: Renée Givens    Procedure(s) Performed: Procedure(s) (LRB):  EGD (ESOPHAGOGASTRODUODENOSCOPY) (N/A)  COLONOSCOPY (N/A)    Final Anesthesia Type: MAC      Patient location during evaluation: GI PACU  Patient participation: Yes- Able to Participate  Level of consciousness: awake and alert and oriented  Post-procedure vital signs: reviewed and stable  Pain management: adequate  Airway patency: patent  RIANA mitigation strategies: Multimodal analgesia and Extubation and recovery carried out in lateral, semiupright, or other nonsupine position  PONV status at discharge: No PONV  Anesthetic complications: no      Cardiovascular status: blood pressure returned to baseline and hemodynamically stable  Respiratory status: unassisted and spontaneous ventilation  Hydration status: euvolemic  Follow-up not needed.  Comments: Report given to PACU RN. Hand Off Tool Used. RN given opportunity to ask questions or clarify concerns. No Concerns verbalized. RN was asked if ready to assume care of patient. RN verbally confirmed. Pt. Left in stable condition. SV. Vital Signs Return to Near Baseline. No s/s of distress noted.               Vitals Value Taken Time   /58 05/30/24 1346   Temp  06/03/24 0816   Pulse 101 05/30/24 1346   Resp 20 05/30/24 1346   SpO2 100 % 05/30/24 1346         Event Time   Out of Recovery 13:50:00         Pain/Carlota Score: No data recorded

## 2024-06-04 ENCOUNTER — PATIENT MESSAGE (OUTPATIENT)
Dept: GASTROENTEROLOGY | Facility: CLINIC | Age: 29
End: 2024-06-04
Payer: MEDICARE

## 2024-06-04 LAB
COMMENT: NORMAL
FINAL PATHOLOGIC DIAGNOSIS: NORMAL
GROSS: NORMAL
Lab: NORMAL

## 2024-06-07 ENCOUNTER — HOSPITAL ENCOUNTER (OUTPATIENT)
Dept: RADIOLOGY | Facility: HOSPITAL | Age: 29
Discharge: HOME | End: 2024-06-07
Attending: INTERNAL MEDICINE
Payer: MEDICARE

## 2024-06-07 DIAGNOSIS — K22.2 ESOPHAGOGASTRIC JUNCTION OUTFLOW OBSTRUCTION: ICD-10-CM

## 2024-06-07 PROCEDURE — 74220 X-RAY XM ESOPHAGUS 1CNTRST: CPT | Mod: 26,,, | Performed by: RADIOLOGY

## 2024-06-07 PROCEDURE — A9698 NON-RAD CONTRAST MATERIALNOC: HCPCS | Performed by: INTERNAL MEDICINE

## 2024-06-07 PROCEDURE — 25500020 PHARM REV CODE 255: Performed by: INTERNAL MEDICINE

## 2024-06-07 PROCEDURE — 74220 X-RAY XM ESOPHAGUS 1CNTRST: CPT | Mod: TC

## 2024-06-07 RX ADMIN — BARIUM SULFATE 135 ML: 980 POWDER, FOR SUSPENSION ORAL at 09:06

## 2024-06-07 RX ADMIN — BARIUM SULFATE 100 G: 960 POWDER, FOR SUSPENSION ORAL at 09:06

## 2024-06-19 ENCOUNTER — HOSPITAL ENCOUNTER (EMERGENCY)
Facility: HOSPITAL | Age: 29
Discharge: HOME | End: 2024-06-20
Attending: EMERGENCY MEDICINE
Payer: MEDICARE

## 2024-06-19 VITALS
HEART RATE: 95 BPM | WEIGHT: 288.81 LBS | OXYGEN SATURATION: 97 % | TEMPERATURE: 97 F | BODY MASS INDEX: 43.92 KG/M2 | DIASTOLIC BLOOD PRESSURE: 87 MMHG | SYSTOLIC BLOOD PRESSURE: 131 MMHG | RESPIRATION RATE: 16 BRPM

## 2024-06-19 DIAGNOSIS — L50.9 HIVES: Primary | ICD-10-CM

## 2024-06-19 PROCEDURE — 96372 THER/PROPH/DIAG INJ SC/IM: CPT | Performed by: EMERGENCY MEDICINE

## 2024-06-19 PROCEDURE — 63600175 PHARM REV CODE 636 W HCPCS: Performed by: NURSE PRACTITIONER

## 2024-06-19 PROCEDURE — 63600175 PHARM REV CODE 636 W HCPCS: Performed by: EMERGENCY MEDICINE

## 2024-06-19 PROCEDURE — 96372 THER/PROPH/DIAG INJ SC/IM: CPT | Performed by: NURSE PRACTITIONER

## 2024-06-19 PROCEDURE — 99284 EMERGENCY DEPT VISIT MOD MDM: CPT | Mod: 25

## 2024-06-19 RX ORDER — DEXAMETHASONE SODIUM PHOSPHATE 4 MG/ML
8 INJECTION, SOLUTION INTRA-ARTICULAR; INTRALESIONAL; INTRAMUSCULAR; INTRAVENOUS; SOFT TISSUE
Status: COMPLETED | OUTPATIENT
Start: 2024-06-19 | End: 2024-06-19

## 2024-06-19 RX ORDER — DIPHENHYDRAMINE HYDROCHLORIDE 50 MG/ML
25 INJECTION INTRAMUSCULAR; INTRAVENOUS
Status: COMPLETED | OUTPATIENT
Start: 2024-06-19 | End: 2024-06-19

## 2024-06-19 RX ADMIN — DEXAMETHASONE SODIUM PHOSPHATE 8 MG: 4 INJECTION INTRA-ARTICULAR; INTRALESIONAL; INTRAMUSCULAR; INTRAVENOUS; SOFT TISSUE at 09:06

## 2024-06-19 RX ADMIN — DIPHENHYDRAMINE HYDROCHLORIDE 25 MG: 50 INJECTION, SOLUTION INTRAMUSCULAR; INTRAVENOUS at 11:06

## 2024-06-20 RX ORDER — PREDNISONE 20 MG/1
40 TABLET ORAL DAILY
Qty: 10 TABLET | Refills: 0 | Status: SHIPPED | OUTPATIENT
Start: 2024-06-20 | End: 2024-06-25

## 2024-06-20 NOTE — ED PROVIDER NOTES
SCRIBE #1 NOTE: I, Ganesh Ny, am scribing for, and in the presence of, Aunpama Pereira MD. I have scribed the entire note.       History     Chief Complaint   Patient presents with    Urticaria     Hives noted throughout, unsure of cause, itchy throat; was recently seen at ER yesterday for same thing; took benadryl around 1900     Review of patient's allergies indicates:   Allergen Reactions    Latex      Other reaction(s): Rash    Nutmeg oil (myristica seed oil)      Other reaction(s): Hives    Pumpkin      Other reaction(s): Hives         History of Present Illness     HPI    6/19/2024, 11:43 PM  History obtained from the patient      History of Present Illness: Renée Givens is a 29 y.o. female patient with a PMHx of anxiety, depression, thyroid disease, hearing impaired with hearing aids, GDM, sleep apnea, preeclampsia who presents to the Emergency Department for evaluation of urticaria which onset gradually 2 days ago. She was seen yesterday at [facility] ER for same sxs, mentions she was treated with steroid injection and benadryl which resolved sxs. She was given EpiPen upon discharge but sxs began again today. Symptoms are constant and moderate in severity. No mitigating or exacerbating factors reported. No other associated sxs. Patient denies any fever, nausea, vomiting, and all other sxs at this time.  No further complaints or concerns at this time.       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  This mutation gives her up to an 85% risk for developing breast cancer.  She also knows that it can cause thyroid (35%) and kidney (35%) cancers.  Uterine cancer risk is as high as 28% and colon cancers occur in 9%.  It can also cause skin, vascular and brain abnormalities.   We discussed aggressive imagi    Screening mammogram for high-risk patient 09/28/2023    Sleep apnea     Thyroid disease     Tremor of right hand        Past Surgical History:  Past Surgical History:   Procedure Laterality Date    COLONOSCOPY N/A 5/30/2024    Procedure: COLONOSCOPY;  Surgeon: Odalys Aguilar MD;  Location: [facility];  Service: Gastroenterology;  Laterality: N/A;    ESOPHAGEAL MOTILITY STUDY USING HIGH RESOLUTION MANOMETRY N/A 4/18/2024    Procedure: MOTILITY STUDY, ESOPHAGUS, USING HIGH RESOLUTION MANOMETRY;  Surgeon: [place], First Available;  Location: [facility];  Service: Endoscopy;  Laterality: N/A;    ESOPHAGOGASTRODUODENOSCOPY N/A 5/30/2024    Procedure: EGD (ESOPHAGOGASTRODUODENOSCOPY);  Surgeon: Odalys Aguilar MD;  Location: [facility];  Service: Gastroenterology;  Laterality: N/A;    HYSTERECTOMY, TOTAL, LAPAROSCOPIC, WITH SALPINGECTOMY  03/07/2024    LITHOTRIPSY      THYROIDECTOMY, PARTIAL      TONSILLECTOMY           Family History:  Family History   Problem Relation Name Age of Onset    Hypertension Mother      Diabetes type II Mother      Hypertension Father      Hypertension Maternal Grandmother      Breast cancer Maternal Grandmother  35    Diabetes type II Maternal Grandmother         Social History:  Social History     Tobacco Use    Smoking status: Never     Passive exposure: Never    Smokeless tobacco: Never   Substance and Sexual Activity    Alcohol use: Yes     Comment: Occasionally    Drug use: Never    Sexual activity: Yes     Partners: Male     Birth control/protection: See Surgical Hx     Comment: Hyst.        Review of  Systems     Review of Systems   Constitutional:  Negative for fever.   HENT:  Negative for sore throat.    Respiratory:  Negative for shortness of breath.    Cardiovascular:  Negative for chest pain.   Gastrointestinal:  Negative for nausea.   Genitourinary:  Negative for dysuria.   Musculoskeletal:  Negative for back pain.   Skin:  Positive for rash.   Neurological:  Negative for weakness.   Hematological:  Does not bruise/bleed easily.        Physical Exam     Initial Vitals [06/19/24 2146]   BP Pulse Resp Temp SpO2   (!) 164/98 (!) 113 20 97.4 °F (36.3 °C) 99 %      MAP       --          Physical Exam  Nursing Notes and Vital Signs Reviewed.  Constitutional: Patient is in no acute distress. Well-developed and well-nourished.  Head: Atraumatic. Normocephalic.  Eyes: PERRL. EOM intact. Conjunctivae are not pale. No scleral icterus.  ENT: Mucous membranes are moist. Oropharynx is clear and symmetric.    Neck: Supple. Full ROM. No lymphadenopathy.  Cardiovascular: Regular rate. Regular rhythm. No murmurs, rubs, or gallops. Distal pulses are 2+ and symmetric.  Pulmonary/Chest: No respiratory distress. Clear to auscultation bilaterally. No wheezing or rales.  Abdominal: Soft and non-distended.  There is no tenderness.  No rebound, guarding, or rigidity. Good bowel sounds.  Genitourinary: No CVA tenderness  Musculoskeletal: Moves all extremities. No obvious deformities. No edema. No calf tenderness.  Skin: Warm and dry. Scattered rash to arms, back, and groin region.  Neurological:  Alert, awake, and appropriate.  Normal speech.  No acute focal neurological deficits are appreciated.  Psychiatric: Normal affect. Good eye contact. Appropriate in content.     ED Course   Procedures  ED Vital Signs:  Vitals:    06/19/24 2146 06/19/24 2148 06/19/24 2345   BP: (!) 164/98 (!) 164/98 131/87   Pulse: (!) 113 105 95   Resp: 20 20 16   Temp: 97.4 °F (36.3 °C) 97.4 °F (36.3 °C)    TempSrc: Oral Oral    SpO2: 99% 98% 97%   Weight:   131 kg (288 lb 12.8 oz)        Abnormal Lab Results:  Labs Reviewed - No data to display     All Lab Results:  NONE    Imaging Results:  Imaging Results    None                 The Emergency Provider reviewed the vital signs and test results, which are outlined above.     ED Discussion     12:41 AM: Reassessed pt at this time. Discussed with pt all pertinent ED information and results. Discussed pt dx and plan of tx. Gave pt all f/u and return to the ED instructions. All questions and concerns were addressed at this time. Pt expresses understanding of information and instructions, and is comfortable with plan to discharge. Pt is stable for discharge.    I discussed with patient and/or family/caretaker that evaluation in the ED does not suggest any emergent or life threatening medical conditions requiring immediate intervention beyond what was provided in the ED, and I believe patient is safe for discharge.  Regardless, an unremarkable evaluation in the ED does not preclude the development or presence of a serious of life threatening condition. As such, patient was instructed to return immediately for any worsening or change in current symptoms.           Medical Decision Making  Risk  Prescription drug management.                ED Medication(s):  Medications   dexAMETHasone injection 8 mg (8 mg Intramuscular Given 6/19/24 3624)   diphenhydrAMINE injection 25 mg (25 mg Intramuscular Given 6/19/24 9942)       Discharge Medication List as of 6/20/2024 12:37 AM        START taking these medications    Details   predniSONE (DELTASONE) 20 MG tablet Take 2 tablets (40 mg total) by mouth once daily. for 5 days, Starting Thu 6/20/2024, Until Tue 6/25/2024, Print              Follow-up Information       [facility] - Emergency Dept. In 2 days.    Specialty: Emergency Medicine  Contact information:  [address] [facility] [address] [phone]             [facility] - All[facility] - [facility].    Specialty:  Allergy  Why: As needed, If symptoms worsen  Contact information:  [address] [facility] [id number] [phone]  Additional information:  Please park on the [address] side and use the Clinic entrance. Check in on the 2nd floor, to the left.             [facility] - Emergency Dept..    Specialty: Emergency Medicine  Contact information:  [address]  [facility] [address] [phone]                               Scribe Attestation:   Scribe #1: I performed the above scribed service and the documentation accurately describes the services I performed. I attest to the accuracy of the note.     Attending:   Physician Attestation Statement for Scribe #1: I, Anupama Pereira MD, personally performed the services described in this documentation, as scribed by Ganesh Ny, in my presence, and it is both accurate and complete.           Clinical Impression       ICD-10-CM ICD-9-CM   1. Hives  L50.9 708.9       Disposition:   Disposition: Discharged  Condition: Stable         Anupama Pereira MD  06/20/24 0610

## 2024-06-20 NOTE — FIRST PROVIDER EVALUATION
Medical screening examination initiated.  I have conducted a focused provider triage encounter, findings are as follows:    Brief history of present illness:  Patient d given medications for allergic reaction in the ED yesterday states that she felt better and the symptoms came back today.  Denies airway involvement    Vitals:    06/19/24 2146 06/19/24 2148   BP: (!) 164/98 (!) 164/98   BP Location: Right arm Right arm   Patient Position: Sitting Sitting   Pulse: (!) 113 105   Resp: 20 20   Temp: 97.4 °F (36.3 °C) 97.4 °F (36.3 °C)   TempSrc: Oral Oral   SpO2: 99% 98%   Weight:  131 kg (288 lb 12.8 oz)       Pertinent physical exam:  Bilateral arm hives present    Brief workup plan:  Medication    Preliminary workup initiated; this workup will be continued and followed by the physician or advanced practice provider that is assigned to the patient when roomed.

## 2024-06-24 ENCOUNTER — PATIENT MESSAGE (OUTPATIENT)
Dept: GASTROENTEROLOGY | Facility: HOSPITAL | Age: 29
End: 2024-06-24
Payer: MEDICARE

## 2024-07-01 ENCOUNTER — PATIENT MESSAGE (OUTPATIENT)
Dept: GASTROENTEROLOGY | Facility: CLINIC | Age: 29
End: 2024-07-01
Payer: MEDICARE

## 2024-07-01 DIAGNOSIS — K22.4 ESOPHAGEAL SPASM: Primary | ICD-10-CM

## 2024-07-08 ENCOUNTER — HOSPITAL ENCOUNTER (OUTPATIENT)
Dept: PREADMISSION TESTING | Facility: HOSPITAL | Age: 29
Discharge: HOME | End: 2024-07-08
Attending: INTERNAL MEDICINE
Payer: MEDICARE

## 2024-07-08 ENCOUNTER — ANESTHESIA EVENT (OUTPATIENT)
Dept: ENDOSCOPY | Facility: HOSPITAL | Age: 29
End: 2024-07-08
Payer: MEDICARE

## 2024-07-08 DIAGNOSIS — K22.4 ESOPHAGEAL SPASM: Primary | ICD-10-CM

## 2024-07-08 NOTE — ANESTHESIA PREPROCEDURE EVALUATION
07/08/2024  Renée Givens is a 29 y.o., female.      Pre-op Assessment    I have reviewed the Patient Summary Reports.     I have reviewed the Nursing Notes. I have reviewed the NPO Status.   I have reviewed the Medications.     Review of Systems  Social:  Non-Smoker, Social Alcohol Use       EENT/Dental:   Patient is deaf          Cardiovascular:     Hypertension                                        Pulmonary:        Sleep Apnea                Renal/:  Chronic Renal Disease                Musculoskeletal:     Tremor right hand            Neurological:    Neuromuscular Disease,                                   Endocrine:   Hypothyroidism          Psych:  Psychiatric History anxiety               Physical Exam  General: Well nourished, Cooperative, Alert and Oriented    Airway:  Mallampati: II   Mouth Opening: Normal  TM Distance: Normal  Tongue: Normal  Neck ROM: Normal ROM    Dental:  Intact      Anesthesia Plan  Type of Anesthesia, risks & benefits discussed:    Anesthesia Type: Gen Natural Airway  Intra-op Monitoring Plan: Standard ASA Monitors  Post Op Pain Control Plan: multimodal analgesia  Induction:  IV  Informed Consent: Informed consent signed with the Patient and all parties understand the risks and agree with anesthesia plan.  All questions answered. Patient consented to blood products? No  ASA Score: 2  Day of Surgery Review of History & Physical: H&P Update referred to the surgeon/provider.    Ready For Surgery From Anesthesia Perspective.     .

## 2024-07-12 ENCOUNTER — HOSPITAL ENCOUNTER (OUTPATIENT)
Facility: HOSPITAL | Age: 29
Discharge: HOME | End: 2024-07-12
Attending: INTERNAL MEDICINE | Admitting: INTERNAL MEDICINE
Payer: MEDICARE

## 2024-07-12 ENCOUNTER — ANESTHESIA (OUTPATIENT)
Dept: ENDOSCOPY | Facility: HOSPITAL | Age: 29
End: 2024-07-12
Payer: MEDICARE

## 2024-07-12 VITALS
WEIGHT: 293 LBS | HEART RATE: 98 BPM | SYSTOLIC BLOOD PRESSURE: 119 MMHG | HEIGHT: 67 IN | TEMPERATURE: 98 F | DIASTOLIC BLOOD PRESSURE: 78 MMHG | BODY MASS INDEX: 45.99 KG/M2 | RESPIRATION RATE: 18 BRPM | OXYGEN SATURATION: 96 %

## 2024-07-12 DIAGNOSIS — K22.2 ESOPHAGOGASTRIC JUNCTION OUTFLOW OBSTRUCTION: Primary | ICD-10-CM

## 2024-07-12 PROCEDURE — 63600175 PHARM REV CODE 636 W HCPCS: Performed by: INTERNAL MEDICINE

## 2024-07-12 PROCEDURE — 25000003 PHARM REV CODE 250: Performed by: NURSE ANESTHETIST, CERTIFIED REGISTERED

## 2024-07-12 PROCEDURE — 63600175 PHARM REV CODE 636 W HCPCS: Performed by: NURSE ANESTHETIST, CERTIFIED REGISTERED

## 2024-07-12 PROCEDURE — 43249 ESOPH EGD DILATION <30 MM: CPT | Mod: ,,, | Performed by: INTERNAL MEDICINE

## 2024-07-12 PROCEDURE — 37000009 HC ANESTHESIA EA ADD 15 MINS: Performed by: INTERNAL MEDICINE

## 2024-07-12 PROCEDURE — C1726 CATH, BAL DIL, NON-VASCULAR: HCPCS | Performed by: INTERNAL MEDICINE

## 2024-07-12 PROCEDURE — 37000008 HC ANESTHESIA 1ST 15 MINUTES: Performed by: INTERNAL MEDICINE

## 2024-07-12 PROCEDURE — 43249 ESOPH EGD DILATION <30 MM: CPT | Performed by: INTERNAL MEDICINE

## 2024-07-12 RX ORDER — PROPOFOL 10 MG/ML
VIAL (ML) INTRAVENOUS
Status: DISCONTINUED | OUTPATIENT
Start: 2024-07-12 | End: 2024-07-12

## 2024-07-12 RX ORDER — SODIUM CHLORIDE, SODIUM LACTATE, POTASSIUM CHLORIDE, CALCIUM CHLORIDE 600; 310; 30; 20 MG/100ML; MG/100ML; MG/100ML; MG/100ML
INJECTION, SOLUTION INTRAVENOUS CONTINUOUS
Status: DISCONTINUED | OUTPATIENT
Start: 2024-07-12 | End: 2024-07-12 | Stop reason: HOSPADM

## 2024-07-12 RX ORDER — LIDOCAINE HYDROCHLORIDE 20 MG/ML
INJECTION INTRAVENOUS
Status: DISCONTINUED | OUTPATIENT
Start: 2024-07-12 | End: 2024-07-12

## 2024-07-12 RX ADMIN — PROPOFOL 75 MG: 10 INJECTION, EMULSION INTRAVENOUS at 08:07

## 2024-07-12 RX ADMIN — PROPOFOL 25 MG: 10 INJECTION, EMULSION INTRAVENOUS at 08:07

## 2024-07-12 RX ADMIN — LIDOCAINE HYDROCHLORIDE 100 MG: 20 INJECTION INTRAVENOUS at 08:07

## 2024-07-12 RX ADMIN — SODIUM CHLORIDE, SODIUM LACTATE, POTASSIUM CHLORIDE, AND CALCIUM CHLORIDE: 600; 310; 30; 20 INJECTION, SOLUTION INTRAVENOUS at 08:07

## 2024-07-12 NOTE — H&P
PRE PROCEDURE H&P    Patient Name: Rneée Givens  MRN: [id number]  : 1995  Date of Procedure:  2024  Referring Physician: Odalys Aguilar MD  Primary Physician: [facility]  Procedure Physician: Odalys Aguilar MD       Planned Procedure: EGD  Diagnosis: EGJ outflow obstruction   Chief Complaint: Same as above    HPI: Patient is an 29 y.o. female is here for the above.     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  This mutation gives her up to an 85% risk for developing breast cancer.  She also knows that it can cause thyroid (35%) and kidney (35%) cancers.  Uterine cancer risk is as high as 28% and colon cancers occur in 9%.  It can also cause skin, vascular and brain abnormalities.   We discussed aggressive imagi    Screening mammogram for high-risk patient 2023    Sleep apnea     Thyroid disease     Tremor of right hand         Past Surgical History:  Past Surgical History:   Procedure Laterality Date    COLONOSCOPY N/A 2024    Procedure: COLONOSCOPY;  Surgeon: Odalys Aguilar MD;  Location: [facility];  Service: Gastroenterology;  Laterality: N/A;    ESOPHAGEAL MOTILITY STUDY USING HIGH RESOLUTION MANOMETRY N/A 2024    Procedure: MOTILITY STUDY, ESOPHAGUS, USING HIGH RESOLUTION MANOMETRY;  Surgeon: [place], First Available;  Location: [facility];  Service: Endoscopy;  Laterality: N/A;    ESOPHAGOGASTRODUODENOSCOPY N/A 2024     "Procedure: EGD (ESOPHAGOGASTRODUODENOSCOPY);  Surgeon: Odalys Aguilar MD;  Location: [facility];  Service: Gastroenterology;  Laterality: N/A;    HYSTERECTOMY, TOTAL, LAPAROSCOPIC, WITH SALPINGECTOMY  03/07/2024    LITHOTRIPSY      THYROIDECTOMY, PARTIAL      TONSILLECTOMY          Home Medications:  Prior to Admission medications    Medication Sig Start Date End Date Taking? Authorizing Provider   levothyroxine (SYNTHROID) 137 MCG Tab tablet Take 137 mcg by mouth. 1/29/24  Yes Provider, Historical   metFORMIN (GLUCOPHAGE-XR) 500 MG ER 24hr tablet  2/8/22  Yes Provider, Historical   montelukast (SINGULAIR) 10 mg tablet Take 10 mg by mouth.   Yes Provider, Historical   acyclovir 5% (ZOVIRAX) 5 % ointment SMARTSIG:sparingly Topical Every 4 Hours PRN 7/27/23   Provider, Historical        Allergies:  Review of patient's allergies indicates:   Allergen Reactions    Latex      Other reaction(s): Rash    Nutmeg oil (myristica seed oil)      Other reaction(s): Hives    Pumpkin      Other reaction(s): Hives        Social History:   Social History     Socioeconomic History    Marital status:    Tobacco Use    Smoking status: Never     Passive exposure: Never    Smokeless tobacco: Never   Substance and Sexual Activity    Alcohol use: Yes     Comment: Occasionally    Drug use: Never    Sexual activity: Yes     Partners: Male     Birth control/protection: See Surgical Hx     Comment: Hyst.       Family History:  Family History   Problem Relation Name Age of Onset    Hypertension Mother      Diabetes type II Mother      Hypertension Father      Hypertension Maternal Grandmother      Breast cancer Maternal Grandmother  35    Diabetes type II Maternal Grandmother         ROS: No acute cardiac events, no acute respiratory complaints.     Physical Exam (all patients):    /78 (BP Location: Right arm, Patient Position: Sitting)   Pulse 100   Temp 97.8 °F (36.6 °C) (Temporal)   Resp 16   Ht 5' 7" (1.702 m)   Wt " 136 kg (299 lb 13.2 oz)   LMP 02/17/2024   SpO2 95%   Breastfeeding No   BMI 46.96 kg/m²   Lungs: Clear to auscultation bilaterally, respirations unlabored  Heart: Regular rate and rhythm, S1 and S2 normal, no obvious murmurs  Abdomen:         Soft, non-tender, bowel sounds normal, no masses, no organomegaly    Lab Results   Component Value Date    WBC 7.56 02/22/2024    MCV 81 (L) 02/22/2024    RDW 16.8 (H) 02/22/2024     02/22/2024     (H) 06/30/2020    HGBA1C 6.2 (H) 01/24/2024    BUN 11 01/18/2023     01/18/2023    K 4.1 01/18/2023     01/18/2023        SEDATION PLAN: per anesthesia      History reviewed, vital signs satisfactory, cardiopulmonary status satisfactory, sedation options, risks and plans have been discussed with the patient  All their questions were answered and the patient agrees to the sedation procedures as planned and the patient is deemed an appropriate candidate for the sedation as planned.    Procedure explained to patient, informed consent obtained and placed in chart.    Odalys Agiular  7/12/2024  8:09 AM

## 2024-07-12 NOTE — PLAN OF CARE
Discharge instructions reviewed with pt, handouts given, verbalized understanding with no further questions at this time. Dr. Aguilar spoke to pt at bedside, reviewed procedure and answered question. VSS on RA, no pain or nausea noted, tolerating po fluids without difficulty, no other complaints noted. Fall precautions reviewed, consents in chart.

## 2024-07-12 NOTE — ANESTHESIA POSTPROCEDURE EVALUATION
Anesthesia Post Evaluation    Patient: Renée Givens    Procedure(s) Performed: Procedure(s) (LRB):  EGD (ESOPHAGOGASTRODUODENOSCOPY) (N/A)    Final Anesthesia Type: general      Patient location during evaluation: PACU  Patient participation: Yes- Able to Participate  Level of consciousness: awake  Post-procedure vital signs: reviewed and stable  Pain management: adequate  Airway patency: patent    PONV status at discharge: No PONV  Anesthetic complications: no      Cardiovascular status: stable  Respiratory status: unassisted  Hydration status: euvolemic  Follow-up not needed.              Vitals Value Taken Time   /66 07/12/24 0838        Pulse 98 07/12/24 0842   Resp 22 07/12/24 0842   SpO2 96 % 07/12/24 0842   Vitals shown include unfiled device data.      No case tracking events are documented in the log.      Pain/Carlota Score: Carlota Score: 10 (7/12/2024  8:32 AM)

## 2024-07-12 NOTE — PROVATION PATIENT INSTRUCTIONS
Discharge Summary/Instructions after an Endoscopic Procedure  Patient Name: Renée Givens  Patient MRN: [id number]  Patient YOB: 1995 Friday, July 12, 2024  Odalys Aguilar MD  Dear patient,  As a result of recent federal legislation (The Federal Cures Act), you may   receive lab or pathology results from your procedure in your [facility]   account before your physician is able to contact you. Your physician or   their representative will relay the results to you with their   recommendations at their soonest availability.  Thank you,  RESTRICTIONS:  During your procedure today, you received medications for sedation.  These   medications may affect your judgment, balance and coordination.  Therefore,   for 24 hours, you have the following restrictions:   - DO NOT drive a car, operate machinery, make legal/financial decisions,   sign important papers or drink alcohol.    ACTIVITY:  Today: no heavy lifting, straining or running due to procedural   sedation/anesthesia.  The following day: return to full activity including work.  DIET:  Eat and drink normally unless instructed otherwise.     TREATMENT FOR COMMON SIDE EFFECTS:  - Mild abdominal pain, nausea, belching, bloating or excessive gas:  rest,   eat lightly and use a heating pad.  - Sore Throat: treat with throat lozenges and/or gargle with warm salt   water.  - Because air was used during the procedure, expelling large amounts of air   from your rectum or belching is normal.  - If a bowel prep was taken, you may not have a bowel movement for 1-3 days.    This is normal.  SYMPTOMS TO WATCH FOR AND REPORT TO YOUR PHYSICIAN:  1. Abdominal pain or bloating, other than gas cramps.  2. Chest pain.  3. Back pain.  4. Signs of infection such as: chills or fever occurring within 24 hours   after the procedure.  5. Rectal bleeding, which would show as bright red, maroon, or black stools.   (A tablespoon of blood from the rectum is not serious, especially  if   hemorrhoids are present.)  6. Vomiting.  7. Weakness or dizziness.  GO DIRECTLY TO THE NEAREST EMERGENCY ROOM IF YOU HAVE ANY OF THE FOLLOWING:      Difficulty breathing              Chills and/or fever over 101 F   Persistent vomiting and/or vomiting blood   Severe abdominal pain   Severe chest pain   Black, tarry stools   Bleeding- more than one tablespoon   Any other symptom or condition that you feel may need urgent attention  Your doctor recommends these additional instructions:  If any biopsies were taken, your doctors clinic will contact you in 1 to 2   weeks with any results.  - Discharge patient to home.   - Resume previous diet.   - Continue present medications.   - Return to referring physician.  For questions, problems or results please call your physician Odalys Aguilar MD at Work:  [phone]  If you have any questions about the above instructions, call the GI   department at [phone] or call the endoscopy unit at [phone]   from 7am until 3 pm.  [facility], EMERGENCY ROOM PHONE NUMBER:   [phone]  IF A COMPLICATION OR EMERGENCY SITUATION ARISES AND YOU ARE UNABLE TO REACH   YOUR PHYSICIAN - GO DIRECTLY TO THE EMERGENCY ROOM.  I have read or have had read to me these discharge instructions for my   procedure and have received a written copy.  I understand these   instructions and will follow-up with my physician if I have any questions.     __________________________________       _____________________________________  Nurse Signature                                          Patient/Designated   Responsible Party Signature  Odalys Aguilar MD  7/12/2024 8:35:40 AM  This report has been verified and signed electronically.  Dear patient,  As a result of recent federal legislation (The Federal Cures Act), you may   receive lab or pathology results from your procedure in your [facility]   account before your physician is able to contact you.  Your physician or   their representative will relay the results to you with their   recommendations at their soonest availability.  Thank you,  PROVATION

## 2024-07-12 NOTE — TRANSFER OF CARE
"Anesthesia Transfer of Care Note    Patient: Renée Givens    Procedure(s) Performed: Procedure(s) (LRB):  EGD (ESOPHAGOGASTRODUODENOSCOPY) (N/A)    Patient location: PACU    Anesthesia Type: general    Transport from OR: Transported from OR on room air with adequate spontaneous ventilation    Post pain: adequate analgesia    Post assessment: no apparent anesthetic complications    Post vital signs: stable    Level of consciousness: awake    Nausea/Vomiting: no nausea/vomiting    Complications: none    Transfer of care protocol was followed      Last vitals: Visit Vitals  /78 (BP Location: Right arm, Patient Position: Sitting)   Pulse 100   Temp 36.6 °C (97.8 °F) (Temporal)   Resp 16   Ht 5' 7" (1.702 m)   Wt 136 kg (299 lb 13.2 oz)   LMP 02/17/2024   SpO2 95%   Breastfeeding No   BMI 46.96 kg/m²     "

## 2024-08-08 ENCOUNTER — OFFICE VISIT (OUTPATIENT)
Dept: DERMATOLOGY | Facility: CLINIC | Age: 29
End: 2024-08-08
Payer: COMMERCIAL

## 2024-08-08 DIAGNOSIS — D22.9 MULTIPLE BENIGN NEVI: ICD-10-CM

## 2024-08-08 DIAGNOSIS — D48.5 NEOPLASM OF UNCERTAIN BEHAVIOR OF SKIN: ICD-10-CM

## 2024-08-08 DIAGNOSIS — L30.4 INTERTRIGO: Primary | ICD-10-CM

## 2024-08-08 DIAGNOSIS — D23.9 DERMATOFIBROMA: ICD-10-CM

## 2024-08-08 DIAGNOSIS — Z15.89 PTEN GENE MUTATION: ICD-10-CM

## 2024-08-08 DIAGNOSIS — Z12.83 SKIN CANCER SCREENING: ICD-10-CM

## 2024-08-08 DIAGNOSIS — B07.9 VERRUCA VULGARIS: ICD-10-CM

## 2024-08-08 PROCEDURE — 99999 PR PBB SHADOW E&M-EST. PATIENT-LVL III: CPT | Mod: PBBFAC,,, | Performed by: STUDENT IN AN ORGANIZED HEALTH CARE EDUCATION/TRAINING PROGRAM

## 2024-08-08 RX ORDER — KETOCONAZOLE 20 MG/G
CREAM TOPICAL 2 TIMES DAILY
Qty: 30 G | Refills: 1 | Status: SHIPPED | OUTPATIENT
Start: 2024-08-08

## 2024-08-08 RX ORDER — HYDROCORTISONE 25 MG/G
CREAM TOPICAL 2 TIMES DAILY
Qty: 30 G | Refills: 1 | Status: SHIPPED | OUTPATIENT
Start: 2024-08-08

## 2024-09-26 ENCOUNTER — OFFICE VISIT (OUTPATIENT)
Dept: PODIATRY | Facility: CLINIC | Age: 29
End: 2024-09-26
Payer: COMMERCIAL

## 2024-09-26 ENCOUNTER — PATIENT MESSAGE (OUTPATIENT)
Dept: PODIATRY | Facility: CLINIC | Age: 29
End: 2024-09-26

## 2024-09-26 ENCOUNTER — HOSPITAL ENCOUNTER (OUTPATIENT)
Dept: RADIOLOGY | Facility: HOSPITAL | Age: 29
Discharge: HOME | End: 2024-09-26
Attending: PODIATRIST
Payer: COMMERCIAL

## 2024-09-26 VITALS — BODY MASS INDEX: 45.99 KG/M2 | WEIGHT: 293 LBS | HEIGHT: 67 IN

## 2024-09-26 DIAGNOSIS — M25.572 PAIN IN LEFT ANKLE AND JOINTS OF LEFT FOOT: ICD-10-CM

## 2024-09-26 DIAGNOSIS — M19.072 OSTEOARTHRITIS OF LEFT ANKLE OR FOOT: ICD-10-CM

## 2024-09-26 DIAGNOSIS — E66.01 MORBID OBESITY: ICD-10-CM

## 2024-09-26 DIAGNOSIS — R29.898 FLOATING OSSICLE OF ANKLE: Primary | ICD-10-CM

## 2024-09-26 PROCEDURE — 99999 PR PBB SHADOW E&M-EST. PATIENT-LVL III: CPT | Mod: PBBFAC,,, | Performed by: PODIATRIST

## 2024-09-26 PROCEDURE — 73630 X-RAY EXAM OF FOOT: CPT | Mod: 26,LT,, | Performed by: RADIOLOGY

## 2024-09-26 PROCEDURE — 1160F RVW MEDS BY RX/DR IN RCRD: CPT | Mod: CPTII,S$GLB,, | Performed by: PODIATRIST

## 2024-09-26 PROCEDURE — 73610 X-RAY EXAM OF ANKLE: CPT | Mod: TC,LT

## 2024-09-26 PROCEDURE — 73610 X-RAY EXAM OF ANKLE: CPT | Mod: 26,LT,, | Performed by: RADIOLOGY

## 2024-09-26 PROCEDURE — 3044F HG A1C LEVEL LT 7.0%: CPT | Mod: CPTII,S$GLB,, | Performed by: PODIATRIST

## 2024-09-26 PROCEDURE — 99204 OFFICE O/P NEW MOD 45 MIN: CPT | Mod: S$GLB,,, | Performed by: PODIATRIST

## 2024-09-26 PROCEDURE — 3008F BODY MASS INDEX DOCD: CPT | Mod: CPTII,S$GLB,, | Performed by: PODIATRIST

## 2024-09-26 PROCEDURE — 73630 X-RAY EXAM OF FOOT: CPT | Mod: TC,LT

## 2024-09-26 PROCEDURE — 1159F MED LIST DOCD IN RCRD: CPT | Mod: CPTII,S$GLB,, | Performed by: PODIATRIST

## 2024-09-26 RX ORDER — CELECOXIB 200 MG/1
200 CAPSULE ORAL DAILY
Qty: 30 CAPSULE | Refills: 0 | Status: SHIPPED | OUTPATIENT
Start: 2024-09-26 | End: 2024-10-26

## 2024-09-26 NOTE — PROGRESS NOTES
Subjective:       Patient ID: Renée Givens is a 29 y.o. female.    Chief Complaint: Ankle Pain and Foot Pain (Ankle/foot pain. Rates pain 6, nondiabetic )      HPI: Renée Givens presents to the clinic today for evaluation concerning stated moderate pains to the left foot/ankle at the AL and AM gutters. Patient states pains are approx. 6/10. Pains are described as sharp and achy. Pains have been present for duration of several weeks. Patient states the pains are exacerbated with walking and standing and prolonged activities. States prior medical evaluation by a MD/DO/DPM/NP (last episode was years ago). States Tylenol and NSAIDs. Trauma is not stated. Patient's Primary Care Provider is Jose Manuel Aguirre [facility].     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  This mutation gives her up to an 85% risk for developing breast cancer.  She also knows that it can cause thyroid (35%) and kidney (35%) cancers.  Uterine cancer risk is as high as 28% and colon cancers occur in 9%.  It can also cause skin, vascular and brain abnormalities.   We discussed aggressive imagi    Screening mammogram for high-risk patient 09/28/2023    Sleep apnea     Thyroid disease     Tremor of right hand        Family History   Problem Relation Name Age of  Onset    Hypertension Mother      Diabetes type II Mother      Hypertension Father      Hypertension Maternal Grandmother      Breast cancer Maternal Grandmother  35    Diabetes type II Maternal Grandmother         Social History     Socioeconomic History    Marital status:    Tobacco Use    Smoking status: Never     Passive exposure: Never    Smokeless tobacco: Never   Substance and Sexual Activity    Alcohol use: Yes     Comment: Occasionally    Drug use: Never    Sexual activity: Yes     Partners: Male     Birth control/protection: See Surgical Hx     Comment: Hyst.     Social Determinants of Health     Financial Resource Strain: Low Risk  (8/2/2024)    Overall Financial Resource Strain (CARDIA)     Difficulty of Paying Living Expenses: Not very hard   Food Insecurity: Food Insecurity Present (8/2/2024)    Hunger Vital Sign     Worried About Running Out of Food in the Last Year: Sometimes true     Ran Out of Food in the Last Year: Sometimes true   Physical Activity: Insufficiently Active (8/2/2024)    Exercise Vital Sign     Days of Exercise per Week: 3 days     Minutes of Exercise per Session: 30 min   Stress: Stress Concern Present (8/2/2024)    [employer] [place] [employer] - Occupational Stress Questionnaire     Feeling of Stress : Very much   Housing Stability: High Risk (8/2/2024)    Housing Stability Vital Sign     Unable to Pay for Housing in the Last Year: Yes       Past Surgical History:   Procedure Laterality Date    COLONOSCOPY N/A 5/30/2024    Procedure: COLONOSCOPY;  Surgeon: Odalys Aguilar MD;  Location: [facility];  Service: Gastroenterology;  Laterality: N/A;    ESOPHAGEAL MOTILITY STUDY USING HIGH RESOLUTION MANOMETRY N/A 4/18/2024    Procedure: MOTILITY STUDY, ESOPHAGUS, USING HIGH RESOLUTION MANOMETRY;  Surgeon: [place], First Available;  Location: [facility];  Service: Endoscopy;  Laterality: N/A;    ESOPHAGOGASTRODUODENOSCOPY N/A 5/30/2024    Procedure: EGD  "(ESOPHAGOGASTRODUODENOSCOPY);  Surgeon: Odalys Aguilar MD;  Location: [facility];  Service: Gastroenterology;  Laterality: N/A;    ESOPHAGOGASTRODUODENOSCOPY N/A 7/12/2024    Procedure: EGD (ESOPHAGOGASTRODUODENOSCOPY);  Surgeon: Odalys Aguilar MD;  Location: [facility];  Service: Gastroenterology;  Laterality: N/A;    HYSTERECTOMY, TOTAL, LAPAROSCOPIC, WITH SALPINGECTOMY  03/07/2024    LITHOTRIPSY      THYROIDECTOMY, PARTIAL      TONSILLECTOMY         Review of Systems   Constitutional:  Negative for chills, fatigue and fever.   HENT:  Negative for hearing loss.    Eyes:  Negative for photophobia and visual disturbance.   Respiratory:  Negative for cough, chest tightness, shortness of breath and wheezing.    Cardiovascular:  Negative for chest pain and palpitations.   Gastrointestinal:  Negative for constipation, diarrhea, nausea and vomiting.   Endocrine: Negative for cold intolerance and heat intolerance.   Genitourinary:  Negative for flank pain.   Musculoskeletal:  Positive for arthralgias and gait problem. Negative for neck pain and neck stiffness.   Neurological:  Negative for light-headedness and headaches.   Psychiatric/Behavioral:  Negative for sleep disturbance.          Objective:   Ht 5' 7" (1.702 m)   Wt 136 kg (299 lb 13.2 oz)   LMP 02/17/2024   BMI 46.96 kg/m²   Physical Exam    LOWER EXTREMITY PHYSICAL EXAMINATION    DERMATOLOGY: Skin is supple, dry and intact.    ORTHOPEDIC: MMT is 5/5 on the LLE as compared to the contra-lateral. Minimal edema is noted at the LLE ankle joint.  Moderate discomfort to palpation of the anteromedial and anterolateral ankle gutters on the left. No ankle crepitus noted.  light posterior heel pains are noted along the Achilles tendon. Medial and lateral ligamentous structures are normal. Moderate navicular pains are noted. Mild to moderate PT tendon pains are noted. Pains to the dorsa TN joint. Rectus foot type is noted. Stress adduction and abduction is " WNL. Mildly antalgic gait is noted.    Assessment:     1. Floating ossicle of ankle    2. Osteoarthritis of left ankle or foot    3. Pain in left ankle and joints of left foot    4. Morbid obesity          Plan:     Floating ossicle of ankle  -     celecoxib (CELEBREX) 200 MG capsule; Take 1 capsule (200 mg total) by mouth once daily.  Dispense: 30 capsule; Refill: 0    Osteoarthritis of left ankle or foot  -     celecoxib (CELEBREX) 200 MG capsule; Take 1 capsule (200 mg total) by mouth once daily.  Dispense: 30 capsule; Refill: 0    Pain in left ankle and joints of left foot  -     X-Ray Ankle Complete Left; Future; Expected date: 10/03/2024  -     X-Ray Foot Complete Left; Future; Expected date: 10/03/2024  -     celecoxib (CELEBREX) 200 MG capsule; Take 1 capsule (200 mg total) by mouth once daily.  Dispense: 30 capsule; Refill: 0    Morbid obesity      Thorough discussion is had with the patient today, concerning the diagnosis, its etiology, and the treatment algorithm at present.     No CSI or steroids due to prior uncontrolled sugars s/p CSI (states no DM or insulin intolerance).     NSAIDs may be difficult given GI pathology.    States recent scope/swallow study.    The pathology report(s) is/are reviewed in detail with the patient. All questions and concerns regarding findings and its/their implications are outlined and discussed.    Start Celebrex QD prn.    Follow up in 3 or so weeks if pains are not alleviated.     Practice good nutrition and healthy eating habits, which may include blood sugar control, to prevent and/or help podiatric foot and ankle complications.    Normal shoe gear is acceptable at present, but she requests CAM Walker.    CAM Walker (Walking Boot), short/tall is dispensed to the patient. The CAM Walker is appropriately fitted and customized to the patient's lower extremity physique by the LPN/MA/Ortho Tech. Patient to ambulate with the CAM Walker at all times. The patient should not  sleep with the device or shower with the device, or drive with the device (if dispensed for right ankle/foot pathology).             Future Appointments   Date Time Provider Department Center   11/11/2024 10:00 AM [facility]  MAMMO2 DX [facility] MAMMO [place] [facility]   11/11/2024 12:30 PM Girish Bauer MD [facility] BREAST Breast Surg   12/18/2024 11:20 AM Magalis Sarmiento MD [facility] OBGYN [place] [facility]

## 2024-10-21 DIAGNOSIS — Z12.39 BREAST CANCER SCREENING, HIGH RISK PATIENT: Primary | ICD-10-CM

## 2024-10-21 DIAGNOSIS — N63.25 BREAST LUMP ON LEFT SIDE AT 3 O'CLOCK POSITION: Primary | ICD-10-CM

## 2024-10-21 DIAGNOSIS — Z12.31 SCREENING MAMMOGRAM, ENCOUNTER FOR: ICD-10-CM

## 2024-10-21 DIAGNOSIS — Z15.89 PTEN GENE MUTATION: ICD-10-CM

## 2024-10-22 ENCOUNTER — PATIENT MESSAGE (OUTPATIENT)
Dept: GASTROENTEROLOGY | Facility: CLINIC | Age: 29
End: 2024-10-22
Payer: MEDICARE

## 2024-10-22 DIAGNOSIS — Z15.89 PTEN GENE MUTATION: ICD-10-CM

## 2024-10-22 DIAGNOSIS — Z12.31 ENCOUNTER FOR SCREENING MAMMOGRAM FOR HIGH-RISK PATIENT: Primary | ICD-10-CM

## 2024-10-23 DIAGNOSIS — Z91.018 MULTIPLE FOOD ALLERGIES: Primary | ICD-10-CM

## 2024-10-24 ENCOUNTER — PATIENT MESSAGE (OUTPATIENT)
Dept: INTERNAL MEDICINE | Facility: CLINIC | Age: 29
End: 2024-10-24
Payer: MEDICARE

## 2024-10-25 ENCOUNTER — PATIENT MESSAGE (OUTPATIENT)
Dept: PODIATRY | Facility: CLINIC | Age: 29
End: 2024-10-25
Payer: MEDICARE

## 2024-11-01 ENCOUNTER — CLINICAL SUPPORT (OUTPATIENT)
Dept: INTERNAL MEDICINE | Facility: CLINIC | Age: 29
End: 2024-11-01
Payer: COMMERCIAL

## 2024-11-01 ENCOUNTER — PATIENT MESSAGE (OUTPATIENT)
Dept: INTERNAL MEDICINE | Facility: CLINIC | Age: 29
End: 2024-11-01

## 2024-11-01 DIAGNOSIS — Z91.018 MULTIPLE FOOD ALLERGIES: ICD-10-CM

## 2024-11-05 ENCOUNTER — PATIENT MESSAGE (OUTPATIENT)
Dept: GASTROENTEROLOGY | Facility: CLINIC | Age: 29
End: 2024-11-05
Payer: MEDICARE

## 2024-11-05 DIAGNOSIS — N63.25 BREAST LUMP ON LEFT SIDE AT 3 O'CLOCK POSITION: ICD-10-CM

## 2024-11-05 DIAGNOSIS — Z15.89 PTEN GENE MUTATION: Primary | ICD-10-CM

## 2024-11-09 ENCOUNTER — PATIENT MESSAGE (OUTPATIENT)
Dept: SURGERY | Facility: CLINIC | Age: 29
End: 2024-11-09
Payer: MEDICARE

## 2024-11-12 ENCOUNTER — OFFICE VISIT (OUTPATIENT)
Dept: PODIATRY | Facility: CLINIC | Age: 29
End: 2024-11-12
Payer: COMMERCIAL

## 2024-11-12 VITALS — HEIGHT: 67 IN | WEIGHT: 293 LBS | BODY MASS INDEX: 45.99 KG/M2

## 2024-11-12 DIAGNOSIS — M19.072 OSTEOARTHRITIS OF LEFT ANKLE OR FOOT: ICD-10-CM

## 2024-11-12 DIAGNOSIS — M25.572 PAIN IN LEFT ANKLE AND JOINTS OF LEFT FOOT: ICD-10-CM

## 2024-11-12 DIAGNOSIS — E66.01 MORBID OBESITY: ICD-10-CM

## 2024-11-12 DIAGNOSIS — R29.898 FLOATING OSSICLE OF ANKLE: Primary | ICD-10-CM

## 2024-11-12 PROCEDURE — 99999 PR PBB SHADOW E&M-EST. PATIENT-LVL III: CPT | Mod: PBBFAC,,, | Performed by: PODIATRIST

## 2024-11-12 PROCEDURE — 3044F HG A1C LEVEL LT 7.0%: CPT | Mod: CPTII,S$GLB,, | Performed by: PODIATRIST

## 2024-11-12 PROCEDURE — 3008F BODY MASS INDEX DOCD: CPT | Mod: CPTII,S$GLB,, | Performed by: PODIATRIST

## 2024-11-12 PROCEDURE — 1159F MED LIST DOCD IN RCRD: CPT | Mod: CPTII,S$GLB,, | Performed by: PODIATRIST

## 2024-11-12 PROCEDURE — 99214 OFFICE O/P EST MOD 30 MIN: CPT | Mod: S$GLB,,, | Performed by: PODIATRIST

## 2024-11-12 PROCEDURE — 1160F RVW MEDS BY RX/DR IN RCRD: CPT | Mod: CPTII,S$GLB,, | Performed by: PODIATRIST

## 2024-11-12 NOTE — PROGRESS NOTES
Subjective:       Patient ID: Renée Givens is a 29 y.o. female.    Chief Complaint: Foot Pain (Left foot pain, rate pain 5/10, nondiabetic, pt is wearing slippers )      HPI: Renée Givens presents to the clinic today for follow up evaluation concerning stated moderate pains to the left foot/ankle at the anterior aspect. At her prior evaluation, XR did reveal TN ossicle with mild DJD. She was Rx. Celebrex as she did not want a CSI due to prior Hx of elevated BS s/p CSI. States burning and pulsatile pains.       Hemoglobin A1C   Date Value Ref Range Status   01/24/2024 6.2 (H) 4.8 - 5.6 % Final     Comment:              Prediabetes: 5.7 - 6.4           Diabetes: >6.4           Glycemic control for adults with diabetes: <7.0   05/27/2022 5.13 4.3 - 6.5 % A1C Final     Comment:     Diabetic Goal: < 7.0%    According to ADA guidelines, a hemoglobin A1c level of 6.5%  or greater is diagnostic of diabetes mellitus.    *For healthy non-diabetic pregnant women,  upper limits of  normal are:  1st Trimester   5.4%  2nd Trimester   5.5%  3rd Trimester   5.8%    *Reference: Course of HbA1C in non-diabetic pregnancy  related to birth weight,  A.RSHANITA Hernadez, AJITH Anaya; [place] Journal of Medicine;  January 2013, vol 71 no 1.           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  This mutation gives her up to an 85% risk for developing breast cancer.  She also knows that it can cause thyroid (35%) and kidney (35%) cancers.  Uterine cancer risk is as high as 28% and colon cancers occur in 9%.  It can also cause skin, vascular and brain abnormalities.   We discussed aggressive imagi    Screening mammogram for high-risk patient 09/28/2023    Sleep apnea     Thyroid disease     Tremor of right hand        Family History   Problem Relation Name Age of Onset    Hypertension Mother      Diabetes type II Mother      Hypertension Father      Hypertension Maternal Grandmother      Breast cancer Maternal Grandmother  35    Diabetes type II Maternal Grandmother         Social History     Socioeconomic History    Marital status:    Tobacco Use    Smoking status: Never     Passive exposure: Never    Smokeless tobacco: Never   Substance and Sexual Activity    Alcohol use: Yes     Comment: Occasionally    Drug use: Never    Sexual activity: Yes     Partners: Male     Birth control/protection: See Surgical Hx     Comment: Hyst.     Social Drivers of Health     Financial Resource Strain: Low Risk  (8/2/2024)    Overall Financial Resource Strain (CARDIA)     Difficulty of Paying Living Expenses: Not very hard   Food Insecurity: Food Insecurity Present (8/2/2024)    Hunger Vital Sign     Worried About Running Out of Food in the Last Year: Sometimes true     Ran Out of Food in the Last Year: Sometimes true   Physical Activity: Insufficiently Active (8/2/2024)    Exercise Vital Sign     Days of Exercise per Week: 3 days     Minutes of Exercise per Session: 30 min   Stress: Stress Concern Present (8/2/2024)    [employer] [employer] - Occupational Stress Questionnaire     Feeling of Stress : Very much   Housing Stability: High Risk (8/2/2024)    Housing Stability Vital Sign     Unable to Pay for Housing in the Last Year: Yes  "      Past Surgical History:   Procedure Laterality Date    COLONOSCOPY N/A 5/30/2024    Procedure: COLONOSCOPY;  Surgeon: Odalys Aguilar MD;  Location: [facility];  Service: Gastroenterology;  Laterality: N/A;    ESOPHAGEAL MOTILITY STUDY USING HIGH RESOLUTION MANOMETRY N/A 4/18/2024    Procedure: MOTILITY STUDY, ESOPHAGUS, USING HIGH RESOLUTION MANOMETRY;  Surgeon: Joselo Sidhu, First Available;  Location: [facility];  Service: Endoscopy;  Laterality: N/A;    ESOPHAGOGASTRODUODENOSCOPY N/A 5/30/2024    Procedure: EGD (ESOPHAGOGASTRODUODENOSCOPY);  Surgeon: Odalys Aguilar MD;  Location: [facility];  Service: Gastroenterology;  Laterality: N/A;    ESOPHAGOGASTRODUODENOSCOPY N/A 7/12/2024    Procedure: EGD (ESOPHAGOGASTRODUODENOSCOPY);  Surgeon: Odalys Aguilar MD;  Location: [facility];  Service: Gastroenterology;  Laterality: N/A;    HYSTERECTOMY, TOTAL, LAPAROSCOPIC, WITH SALPINGECTOMY  03/07/2024    LITHOTRIPSY      THYROIDECTOMY, PARTIAL      TONSILLECTOMY         Review of Systems   Constitutional:  Negative for chills, fatigue and fever.   HENT:  Negative for hearing loss.    Eyes:  Negative for photophobia and visual disturbance.   Respiratory:  Negative for cough, chest tightness, shortness of breath and wheezing.    Cardiovascular:  Negative for chest pain and palpitations.   Gastrointestinal:  Negative for constipation, diarrhea, nausea and vomiting.   Endocrine: Negative for cold intolerance and heat intolerance.   Genitourinary:  Negative for flank pain.   Musculoskeletal:  Positive for arthralgias and gait problem. Negative for neck pain and neck stiffness.   Neurological:  Negative for light-headedness and headaches.   Psychiatric/Behavioral:  Negative for sleep disturbance.          Objective:   Ht 5' 7" (1.702 m)   Wt 136 kg (299 lb 13.2 oz)   LMP 02/17/2024   BMI 46.96 kg/m²   Physical Exam    LOWER EXTREMITY PHYSICAL EXAMINATION    DERMATOLOGY: Skin is supple, dry and " intact.    ORTHOPEDIC: MMT is 5/5 on the LLE as compared to the contra-lateral. Minimal edema is noted at the LLE ankle joint.  Moderate discomfort to palpation of the TN joint. No ankle crepitus noted. Mild to moderate PT tendon pains are noted. Rectus foot type is noted. Mildly antalgic gait is noted.    Assessment:     1. Floating ossicle of ankle    2. Osteoarthritis of left ankle or foot    3. Pain in left ankle and joints of left foot    4. Morbid obesity          Plan:     Floating ossicle of ankle  -     CT Foot Without Contrast Left; Future; Expected date: 11/19/2024    Osteoarthritis of left ankle or foot  -     CT Foot Without Contrast Left; Future; Expected date: 11/19/2024    Pain in left ankle and joints of left foot  -     CT Foot Without Contrast Left; Future; Expected date: 11/19/2024    Morbid obesity      Thorough discussion is had with the patient today, concerning the diagnosis, its etiology, and the treatment algorithm at present.     Continue NSAIDs prn.    Obtain CT Scan evaluation for pre-op.    The procedure of (resection of TN ossicle and etc...) was thoroughly explained to the patient. Its necessity and/or purpose and the implications therein were outlined, including any pertinent advantages and/or disadvantages, and possible complications, if any. Possible complications include recurrence of pathology and/or deformity, infection (cellulitis, drainage, purulence, malodor, etc...), pain, numbness, neuritis, edema, burning, loss of function, need for further surgery, possible need for removal of any implanted hardware, soft tissue contracture and/or scarring, etc... No guarantees were given and/or implied. Post-operative expectations and weightbearing protocol is thoroughly explained to the patient, who acknowledges understanding.           Future Appointments   Date Time Provider Department Center   11/14/2024  9:30 AM [facility] CT1 LIMIT 500 LBS [facility] CT SCAN [place]   12/9/2024  3:15 PM  Girish Bauer MD [facility] BREAST Breast Surg   12/18/2024 11:20 AM Magalis Sarmiento MD [facility] [place] [facility]

## 2024-11-14 ENCOUNTER — HOSPITAL ENCOUNTER (OUTPATIENT)
Dept: RADIOLOGY | Facility: HOSPITAL | Age: 29
Discharge: HOME | End: 2024-11-14
Attending: PODIATRIST
Payer: COMMERCIAL

## 2024-11-14 DIAGNOSIS — M19.072 OSTEOARTHRITIS OF LEFT ANKLE OR FOOT: ICD-10-CM

## 2024-11-14 DIAGNOSIS — M25.572 PAIN IN LEFT ANKLE AND JOINTS OF LEFT FOOT: ICD-10-CM

## 2024-11-14 DIAGNOSIS — R29.898 FLOATING OSSICLE OF ANKLE: ICD-10-CM

## 2024-11-14 PROCEDURE — 73700 CT LOWER EXTREMITY W/O DYE: CPT | Mod: 26,LT,, | Performed by: RADIOLOGY

## 2024-11-14 PROCEDURE — 73700 CT LOWER EXTREMITY W/O DYE: CPT | Mod: TC,LT

## 2024-11-15 ENCOUNTER — TELEPHONE (OUTPATIENT)
Dept: PHYSICAL MEDICINE AND REHAB | Facility: CLINIC | Age: 29
End: 2024-11-15
Payer: MEDICARE

## 2024-11-15 DIAGNOSIS — M54.17 LUMBOSACRAL RADICULOPATHY: ICD-10-CM

## 2024-11-15 DIAGNOSIS — M25.572 PAIN IN LEFT ANKLE AND JOINTS OF LEFT FOOT: Primary | ICD-10-CM

## 2024-11-18 DIAGNOSIS — N63.25 BREAST LUMP ON LEFT SIDE AT 3 O'CLOCK POSITION: ICD-10-CM

## 2024-11-18 DIAGNOSIS — Z15.89 PTEN GENE MUTATION: Primary | ICD-10-CM

## 2024-11-19 ENCOUNTER — PATIENT MESSAGE (OUTPATIENT)
Dept: GASTROENTEROLOGY | Facility: CLINIC | Age: 29
End: 2024-11-19
Payer: MEDICARE

## 2024-11-19 NOTE — PROGRESS NOTES
Date of Service: 12/9/2024    Chief Complaint:   Renée Givens is a 29 y.o. female presenting today for her 6-month evaluation. She is due for a repeat mammogram and ultrasound.  She reports no interval changes.     History of Present Illness:   Mrs. 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  She has a worrisome family history of breast cancer.  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  This mutation gives her up to an 85% risk for developing breast cancer.  She also knows that it can cause thyroid (35%) and kidney (35%) cancers.  Uterine cancer risk is as high as 28% and colon cancers occur in 9%.  It can also cause skin, vascular and brain abnormalities.   We discussed aggressive imagi    Screening mammogram for high-risk patient 09/28/2023    Sleep apnea     Thyroid disease     Tremor of right hand       Past Surgical History:   Procedure Laterality Date    COLONOSCOPY N/A 5/30/2024    Procedure: COLONOSCOPY;  Surgeon: Odalys Aguilar MD;  Location: [facility];  Service: Gastroenterology;  Laterality: N/A;    ESOPHAGEAL MOTILITY STUDY USING HIGH RESOLUTION MANOMETRY N/A 4/18/2024    Procedure: MOTILITY STUDY, ESOPHAGUS, USING HIGH RESOLUTION MANOMETRY;  Surgeon: Ken[place], First Available;  Location: [facility];  Service: Endoscopy;  Laterality: N/A;    ESOPHAGOGASTRODUODENOSCOPY N/A 5/30/2024    Procedure: EGD (ESOPHAGOGASTRODUODENOSCOPY);  Surgeon: Jeff  Odalys RIVERA MD;  Location: [facility] ENDO;  Service: Gastroenterology;  Laterality: N/A;    ESOPHAGOGASTRODUODENOSCOPY N/A 7/12/2024    Procedure: EGD (ESOPHAGOGASTRODUODENOSCOPY);  Surgeon: Odalys Aguilar MD;  Location: [facility];  Service: Gastroenterology;  Laterality: N/A;    HYSTERECTOMY, TOTAL, LAPAROSCOPIC, WITH SALPINGECTOMY  03/07/2024    LITHOTRIPSY      THYROIDECTOMY, PARTIAL      TONSILLECTOMY          Current Outpatient Medications:     acyclovir 5% (ZOVIRAX) 5 % ointment, SMARTSIG:sparingly Topical Every 4 Hours PRN, Disp: , Rfl:     hydrocortisone 2.5 % cream, Apply topically 2 (two) times daily. Mix with ketoconazole cream and AAA on groin BID for up to 2 weeks at a time, Disp: 30 g, Rfl: 1    ketoconazole (NIZORAL) 2 % cream, Apply topically 2 (two) times daily. Mix with hydrocortisone cream and AAA on groin BID for up to 2 weeks at a time, Disp: 30 g, Rfl: 1    levothyroxine (SYNTHROID) 137 MCG Tab tablet, Take 137 mcg by mouth., Disp: , Rfl:     metFORMIN (GLUCOPHAGE-XR) 500 MG ER 24hr tablet, , Disp: , Rfl:     montelukast (SINGULAIR) 10 mg tablet, Take 10 mg by mouth., Disp: , Rfl:    Review of patient's allergies indicates:   Allergen Reactions    Latex      Other reaction(s): Rash    Nutmeg oil (myristica seed oil)      Other reaction(s): Hives    Pumpkin      Other reaction(s): Hives      Social History     Tobacco Use    Smoking status: Never     Passive exposure: Never    Smokeless tobacco: Never   Substance Use Topics    Alcohol use: Yes     Comment: Occasionally      Family History   Problem Relation Name Age of Onset    Hypertension Mother      Diabetes type II Mother      Hypertension Father      Hypertension Maternal Grandmother      Breast cancer Maternal Grandmother  35    Diabetes type II Maternal Grandmother          Review of Systems   Integumentary:  Negative for color change, rash, mole/lesion, thickening/swelling, dimpling of skin, drainage  Breast: Negative for mass and  tenderness     Physical Exam   Constitutional: She appears well-developed. She is cooperative.   HENT: Normocephalic.   Cardiovascular:  Normal rate and regular rhythm.            Pulmonary/Chest: She exhibits no tenderness and no bony tenderness.   Abdominal: Soft. Normal appearance.   Musculoskeletal: Intact with no deficits  Neurological: She is alert.   Skin: No rash noted.   Breast and Chest Wall Evaluation:   Right breast exhibits no mass, no nipple discharge, no skin change and no tenderness.     Left breast exhibits no mass, no nipple discharge, no skin change and no tenderness.     Lymphadenopathy: No supraclavicular or axillary adenopathy.    MAMMOGRAM REPORT: She has some diffuse fibronodular tissue; there are no spiculated lesions, distortions or suspicious calcifications noted; NEM    ULTRASOUND REPORT: She has a stable nodule with NEM    ASSESSMENT and PLAN OF CARE     1. Breast lump on left side at 3 o'clock position  Assessment & Plan:  We reviewed our findings today and her questions were answered.  She understands that her imaging and exams have remained stable (and show nothing concerning).  The 8 mm nodule is stable and appears benign.  She is comfortable being followed in a conservative fashion.      She understands the importance of monthly self-breast examination and knows to report any and all changes as they occur.    NOTE:::We viewed her films together at today's visit.  We discussed the multiple views obtained and the important findings.  Even benign changes were mentioned and her questions were answered.  She knows that she may receive a formal letter or report from the Radiologist.  She is to contact us if she has questions.         2. PTEN gene mutation Positive  Assessment & Plan:  We again discussed the implications of her PTEN mutation.  She understands this will give her up to 89% risk of developing breast cancer.  Other cancers are also increased risk.  At this time she is  comfortable following up every 6 months alternating mammogram and MRI.  She understands that bilateral risk reduction mastectomy as an option.  She is considering this.      Medical Decision Making: It is my impression that this patient suffers all conditions contained in this medical document.  Each of these conditions did affect our plan of care and my medical decision making today.  It is my opinion that the medical decision making concerning this patient was of minimal  difficulty based on the aforementioned conditions.  Any further recommendations will be communicated to the patient by me.  I have reviewed and verified her allergies, list of medications, medical and surgical histories, social history, and a pertinent review of symptoms.     Follow up: 6 months and prn    For:  Physical Examination and MRI

## 2024-11-19 NOTE — ASSESSMENT & PLAN NOTE
We again discussed the implications of her PTEN mutation.  She understands this will give her up to 89% risk of developing breast cancer.  Other cancers are also increased risk.  At this time she is comfortable following up every 6 months alternating mammogram and MRI.  She understands that bilateral risk reduction mastectomy as an option.  She is considering this.   Cartilage Graft Text: The defect edges were debeveled with a #15 scalpel blade.  Given the location of the defect, shape of the defect, the fact the defect involved a full thickness cartilage defect a cartilage graft was deemed most appropriate.  An appropriate donor site was identified, cleansed, and anesthetized. The cartilage graft was then harvested and transferred to the recipient site, oriented appropriately and then sutured into place.  The secondary defect was then repaired using a primary closure.

## 2024-11-20 ENCOUNTER — TELEPHONE (OUTPATIENT)
Dept: PHYSICAL MEDICINE AND REHAB | Facility: CLINIC | Age: 29
End: 2024-11-20
Payer: MEDICARE

## 2024-11-20 NOTE — TELEPHONE ENCOUNTER
----- Message from Fausto Jennings DPM sent at 11/20/2024  8:10 AM CST -----  Regarding: RE: EMG/NCV  Morning madam.    Just spoke to her.     Can we buzz again?  ----- Message -----  From: Michaelle Sue LPN  Sent: 11/15/2024  12:09 PM CST  To: Fausto Jennings DPM  Subject: RE: EMG/NCV                                      Left her a message to call back to schedule  ----- Message -----  From: Fausto Jennings DPM  Sent: 11/15/2024  11:24 AM CST  To: Michaelle Sue LPN; Fausto Jennings DPM  Subject: EMG/NCV

## 2024-12-09 ENCOUNTER — OFFICE VISIT (OUTPATIENT)
Dept: SURGERY | Facility: CLINIC | Age: 29
End: 2024-12-09
Payer: COMMERCIAL

## 2024-12-09 DIAGNOSIS — Z15.89 PTEN GENE MUTATION: ICD-10-CM

## 2024-12-09 DIAGNOSIS — N63.25 BREAST LUMP ON LEFT SIDE AT 3 O'CLOCK POSITION: Primary | ICD-10-CM

## 2024-12-09 PROCEDURE — 1159F MED LIST DOCD IN RCRD: CPT | Mod: CPTII,S$GLB,, | Performed by: SPECIALIST

## 2024-12-09 PROCEDURE — 99999 PR PBB SHADOW E&M-EST. PATIENT-LVL II: CPT | Mod: PBBFAC,,, | Performed by: SPECIALIST

## 2024-12-09 PROCEDURE — 1160F RVW MEDS BY RX/DR IN RCRD: CPT | Mod: CPTII,S$GLB,, | Performed by: SPECIALIST

## 2024-12-09 PROCEDURE — 99213 OFFICE O/P EST LOW 20 MIN: CPT | Mod: S$GLB,,, | Performed by: SPECIALIST

## 2024-12-09 PROCEDURE — 3044F HG A1C LEVEL LT 7.0%: CPT | Mod: CPTII,S$GLB,, | Performed by: SPECIALIST

## 2024-12-10 ENCOUNTER — TELEPHONE (OUTPATIENT)
Dept: GASTROENTEROLOGY | Facility: CLINIC | Age: 29
End: 2024-12-10

## 2024-12-10 ENCOUNTER — OFFICE VISIT (OUTPATIENT)
Dept: GASTROENTEROLOGY | Facility: CLINIC | Age: 29
End: 2024-12-10
Payer: COMMERCIAL

## 2024-12-10 ENCOUNTER — PATIENT MESSAGE (OUTPATIENT)
Dept: GASTROENTEROLOGY | Facility: CLINIC | Age: 29
End: 2024-12-10

## 2024-12-10 VITALS — HEIGHT: 67 IN | BODY MASS INDEX: 45.99 KG/M2 | WEIGHT: 293 LBS

## 2024-12-10 DIAGNOSIS — Z91.018 MULTIPLE FOOD ALLERGIES: ICD-10-CM

## 2024-12-10 DIAGNOSIS — K31.89 ESOPHAGOGASTRIC JUNCTION OUTFLOW OBSTRUCTION: ICD-10-CM

## 2024-12-10 DIAGNOSIS — Z15.89 PTEN GENE MUTATION: ICD-10-CM

## 2024-12-10 DIAGNOSIS — R19.7 DIARRHEA, UNSPECIFIED TYPE: Primary | ICD-10-CM

## 2024-12-10 DIAGNOSIS — K22.89 ESOPHAGOGASTRIC JUNCTION OUTFLOW OBSTRUCTION: ICD-10-CM

## 2024-12-10 PROCEDURE — 3044F HG A1C LEVEL LT 7.0%: CPT | Mod: CPTII,95,, | Performed by: PHYSICIAN ASSISTANT

## 2024-12-10 PROCEDURE — 3008F BODY MASS INDEX DOCD: CPT | Mod: CPTII,95,, | Performed by: PHYSICIAN ASSISTANT

## 2024-12-10 PROCEDURE — 99213 OFFICE O/P EST LOW 20 MIN: CPT | Mod: 95,,, | Performed by: PHYSICIAN ASSISTANT

## 2024-12-10 NOTE — TELEPHONE ENCOUNTER
Regarding your recent visit.  Please  your stool kit from any [facility] lab location and we will send you a follow up appt through my chart.

## 2024-12-10 NOTE — PROGRESS NOTES
Subjective:      Patient ID: Renée Givens is a 29 y.o. female.    Chief Complaint: Diarrhea (Constantly pooping the whole meds, it's not absorbing.. hasn't had a normal BM in a long time)    The patient location is: [place]  The chief complaint leading to consultation is: See above    Visit type: audiovisual    Face to Face time with patient: 16 minutes  24 minutes of total time spent on the encounter, which includes face to face time and non-face to face time preparing to see the patient (eg, review of tests), Obtaining and/or reviewing separately obtained history, Documenting clinical information in the electronic or other health record, Independently interpreting results (not separately reported) and communicating results to the patient/family/caregiver, or Care coordination (not separately reported).         Each patient to whom he or she provides medical services by telemedicine is:  (1) informed of the relationship between the physician and patient and the respective role of any other health care provider with respect to management of the patient; and (2) notified that he or she may decline to receive medical services by telemedicine and may withdraw from such care at any time.    Notes:     HPI  The patient has a history of TEN mutation, diarrhea and esophagogastric junction outflow obstruction. She was last seen in May by Dr. Aguilar. Recommendations included EGD, colonoscopy and barium swallow. See results below.     Today the patient reports continued issues with her stools. She is having diarrhea 5-10 times every day. It sometimes wakes her. She is frustrated. She also thinks she may not be digesting her medications because she sees pills floating in it. She says this isn't new. She did start two new lupus meds about six months ago, pilocarpine and hydroxychloroquine, but she feels the diarrhea started before. However, it has gotten worse.   She had her gallbladder.   If she takes anything for the  diarrhea, it stops her up and she becomes constipated.   She says stools sink.   She says she isn't eating much but can't lose weight. Last available albumin was 3.8 in August.        EGD (05/30/24): erythematous duodenopathy, erosive gastropathy, gastric polyp and nodule in the esophagus. Path with chronic gastritis. Neg for H. Pylori.   Colonoscopy (05/30/24): prep was good. multiple polyps. Repeat in 1 year.   Path: mucosal ganglioneuroma and hyperplastic changes. No microscopic colitis.  Esopahgram (06/07/24): Narrowing of the GE junction. No hiatal hernia. Minimal tertiary contractions which could reflect mild dysmotility.   EGD (07/12/24): few gastric polyps, dilatation performed in the lower third of the esophagus.   April 2024  TTG/IgA - negative  CRP - 15.3  Calprotectin - 59  Giardia/cryptosporidum - negative  Pancreatic elastase - negative   OCP - negative  Stool Cx - Negative    Review of Systems  As per HPI.     Objective:     Physical Exam  Constitutional:       General: She is not in acute distress.     Appearance: She is well-developed.   HENT:      Head: Normocephalic and atraumatic.   Pulmonary:      Effort: Pulmonary effort is normal.   Neurological:      Mental Status: She is alert and oriented to person, place, and time.      Cranial Nerves: No cranial nerve deficit.   Psychiatric:         Behavior: Behavior normal.         Assessment:     1. Diarrhea, unspecified type    2. Esophagogastric junction outflow obstruction    3. PTEN gene mutation Positive    4. Multiple food allergies        Plan:     Discussed results of her colonoscopy. No evidence of IBD or other cause for her diarrhea. It is possible that her new medications are contributing.  Symptoms not consistent with malabsorption but could check fecal fat and some labs.   I recommend starting Metamucil bid to help improve the consistency of her stools.    Orders Placed This Encounter   Procedures    Fecal fat, qualitative       Follow up  in about 8 weeks (around 2/4/2025).    Thank you for the opportunity to participate in the care of this patient.   Eb Perdomo PA-C.

## 2024-12-10 NOTE — TELEPHONE ENCOUNTER
Contacted pt regarding stool kit ordered by Eb , stool kit has been made  and set at the . Pt stated she will  the on 12/11/24

## 2024-12-11 ENCOUNTER — LAB VISIT (OUTPATIENT)
Dept: LAB | Facility: HOSPITAL | Age: 29
End: 2024-12-11
Attending: PHYSICIAN ASSISTANT
Payer: MEDICARE

## 2024-12-11 DIAGNOSIS — R19.7 DIARRHEA, UNSPECIFIED TYPE: ICD-10-CM

## 2024-12-11 PROCEDURE — 82705 FATS/LIPIDS FECES QUAL: CPT | Performed by: PHYSICIAN ASSISTANT

## 2024-12-14 LAB
FAT STL QL: NORMAL
NEUTRAL FAT STL QL: NORMAL

## 2025-01-10 ENCOUNTER — PATIENT MESSAGE (OUTPATIENT)
Dept: PODIATRY | Facility: CLINIC | Age: 30
End: 2025-01-10
Payer: MEDICARE